# Patient Record
Sex: FEMALE | Race: WHITE | NOT HISPANIC OR LATINO | Employment: FULL TIME | URBAN - METROPOLITAN AREA
[De-identification: names, ages, dates, MRNs, and addresses within clinical notes are randomized per-mention and may not be internally consistent; named-entity substitution may affect disease eponyms.]

---

## 2021-08-13 ENCOUNTER — HOSPITAL ENCOUNTER (EMERGENCY)
Facility: HOSPITAL | Age: 36
Discharge: HOME | End: 2021-08-13
Attending: EMERGENCY MEDICINE
Payer: OTHER MISCELLANEOUS

## 2021-08-13 VITALS
OXYGEN SATURATION: 99 % | SYSTOLIC BLOOD PRESSURE: 168 MMHG | DIASTOLIC BLOOD PRESSURE: 100 MMHG | TEMPERATURE: 98 F | HEIGHT: 70 IN | RESPIRATION RATE: 18 BRPM | HEART RATE: 89 BPM | WEIGHT: 270 LBS | BODY MASS INDEX: 38.65 KG/M2

## 2021-08-13 DIAGNOSIS — S13.4XXA WHIPLASH INJURY TO NECK, INITIAL ENCOUNTER: ICD-10-CM
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
## 2021-08-13 DIAGNOSIS — M62.838 NECK MUSCLE SPASM: ICD-10-CM
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
## 2021-08-13 DIAGNOSIS — M54.9 BACK PAIN, UNSPECIFIED BACK LOCATION, UNSPECIFIED BACK PAIN LATERALITY, UNSPECIFIED CHRONICITY: ICD-10-CM

## 2021-08-13 LAB
AMPHET+METHAMPHET UR QL: NEGATIVE
B-HCG UR QL: NEGATIVE
BARBITURATES UR QL SCN>200 NG/ML: NEGATIVE
BENZODIAZ UR QL SCN>200 NG/ML: NEGATIVE
BZE UR QL SCN: NEGATIVE
CANNABINOIDS UR QL SCN: NEGATIVE
CREAT UR-MCNC: 276 MG/DL (ref 15–325)
CTP QC/QA: YES
OPIATES UR QL SCN: NEGATIVE
PCP UR QL SCN>25 NG/ML: NEGATIVE
TOXICOLOGY INFORMATION: NORMAL

## 2021-08-13 PROCEDURE — 63600175 PHARM REV CODE 636 W HCPCS: Performed by: NURSE PRACTITIONER

## 2021-08-13 PROCEDURE — 96372 THER/PROPH/DIAG INJ SC/IM: CPT | Mod: 59

## 2021-08-13 PROCEDURE — 80307 DRUG TEST PRSMV CHEM ANLYZR: CPT | Performed by: NURSE PRACTITIONER

## 2021-08-13 PROCEDURE — 96372 THER/PROPH/DIAG INJ SC/IM: CPT

## 2021-08-13 PROCEDURE — 99284 EMERGENCY DEPT VISIT MOD MDM: CPT

## 2021-08-13 PROCEDURE — 81025 URINE PREGNANCY TEST: CPT | Performed by: NURSE PRACTITIONER

## 2021-08-13 RX ORDER — KETOROLAC TROMETHAMINE 30 MG/ML
15 INJECTION, SOLUTION INTRAMUSCULAR; INTRAVENOUS
Status: DISCONTINUED | OUTPATIENT
Start: 2021-08-13 | End: 2021-08-13

## 2021-08-13 RX ORDER — LIDOCAINE 50 MG/G
1 PATCH TOPICAL DAILY
Qty: 15 PATCH | Refills: 0 | Status: SHIPPED | OUTPATIENT
Start: 2021-08-13

## 2021-08-13 RX ORDER — IBUPROFEN 600 MG/1
600 TABLET ORAL EVERY 6 HOURS PRN
Qty: 20 TABLET | Refills: 0 | Status: SHIPPED | OUTPATIENT
Start: 2021-08-13

## 2021-08-13 RX ORDER — ORPHENADRINE CITRATE 30 MG/ML
60 INJECTION INTRAMUSCULAR; INTRAVENOUS
Status: COMPLETED | OUTPATIENT
Start: 2021-08-13 | End: 2021-08-13

## 2021-08-13 RX ORDER — METHOCARBAMOL 500 MG/1
1000 TABLET, FILM COATED ORAL 3 TIMES DAILY
Qty: 30 TABLET | Refills: 0 | Status: SHIPPED | OUTPATIENT
Start: 2021-08-13 | End: 2021-08-18

## 2021-08-13 RX ORDER — KETOROLAC TROMETHAMINE 30 MG/ML
15 INJECTION, SOLUTION INTRAMUSCULAR; INTRAVENOUS
Status: COMPLETED | OUTPATIENT
Start: 2021-08-13 | End: 2021-08-13

## 2021-08-13 RX ADMIN — ORPHENADRINE CITRATE 60 MG: 60 INJECTION INTRAMUSCULAR; INTRAVENOUS at 06:08

## 2021-08-13 RX ADMIN — KETOROLAC TROMETHAMINE 15 MG: 30 INJECTION, SOLUTION INTRAMUSCULAR; INTRAVENOUS at 06:08

## 2021-10-20 ENCOUNTER — CLINICAL SUPPORT (OUTPATIENT)
Dept: URGENT CARE | Facility: CLINIC | Age: 36
End: 2021-10-20

## 2021-10-20 PROCEDURE — 86580 TB INTRADERMAL TEST: CPT | Mod: S$GLB,,, | Performed by: EMERGENCY MEDICINE

## 2021-10-20 PROCEDURE — 86580 PR  TB INTRADERMAL TEST: ICD-10-PCS | Mod: S$GLB,,, | Performed by: EMERGENCY MEDICINE

## 2022-03-27 ENCOUNTER — OFFICE VISIT (OUTPATIENT)
Dept: URGENT CARE | Facility: CLINIC | Age: 37
End: 2022-03-27

## 2022-03-27 VITALS
DIASTOLIC BLOOD PRESSURE: 91 MMHG | RESPIRATION RATE: 18 BRPM | OXYGEN SATURATION: 97 % | HEIGHT: 70 IN | TEMPERATURE: 99 F | HEART RATE: 110 BPM | SYSTOLIC BLOOD PRESSURE: 137 MMHG | WEIGHT: 278 LBS | BODY MASS INDEX: 39.8 KG/M2

## 2022-03-27 DIAGNOSIS — G44.329 CHRONIC POST-TRAUMATIC HEADACHE, NOT INTRACTABLE: ICD-10-CM

## 2022-03-27 DIAGNOSIS — M54.2 CHRONIC NECK PAIN: ICD-10-CM

## 2022-03-27 DIAGNOSIS — I95.1 ORTHOSTATIC HYPOTENSION: Primary | ICD-10-CM

## 2022-03-27 DIAGNOSIS — G89.29 CHRONIC NECK PAIN: ICD-10-CM

## 2022-03-27 DIAGNOSIS — R40.20 LOC (LOSS OF CONSCIOUSNESS): ICD-10-CM

## 2022-03-27 PROCEDURE — 99213 OFFICE O/P EST LOW 20 MIN: CPT | Mod: S$GLB,,, | Performed by: NURSE PRACTITIONER

## 2022-03-27 PROCEDURE — 99213 PR OFFICE/OUTPT VISIT, EST, LEVL III, 20-29 MIN: ICD-10-PCS | Mod: S$GLB,,, | Performed by: NURSE PRACTITIONER

## 2022-03-27 RX ORDER — METHOCARBAMOL 500 MG/1
500 TABLET, FILM COATED ORAL
COMMUNITY
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
## 2022-07-22 NOTE — PROGRESS NOTES
Chart sent to medical director for chart review per [facility] collaborative requirement.

## 2022-08-22 DIAGNOSIS — G47.19 EXCESSIVE DAYTIME SLEEPINESS: ICD-10-CM

## 2022-08-22 DIAGNOSIS — R06.83 SNORING: ICD-10-CM

## 2022-08-22 DIAGNOSIS — G47.33 OSA (OBSTRUCTIVE SLEEP APNEA): Primary | ICD-10-CM

## 2022-08-22 DIAGNOSIS — R53.83 LOSS OF ENERGY: ICD-10-CM

## 2022-09-08 ENCOUNTER — PROCEDURE VISIT (OUTPATIENT)
Dept: SLEEP MEDICINE | Facility: HOSPITAL | Age: 37
End: 2022-09-08
Attending: INTERNAL MEDICINE
Payer: COMMERCIAL

## 2022-09-08 DIAGNOSIS — R53.83 LOSS OF ENERGY: ICD-10-CM

## 2022-09-08 DIAGNOSIS — G47.33 OSA (OBSTRUCTIVE SLEEP APNEA): ICD-10-CM

## 2022-09-08 DIAGNOSIS — R06.83 SNORING: ICD-10-CM

## 2022-09-08 DIAGNOSIS — G47.19 EXCESSIVE DAYTIME SLEEPINESS: ICD-10-CM

## 2022-09-08 PROCEDURE — 95806 SLEEP STUDY UNATT&RESP EFFT: CPT

## 2023-03-10 ENCOUNTER — TELEPHONE (OUTPATIENT)
Dept: FAMILY MEDICINE | Facility: CLINIC | Age: 38
End: 2023-03-10

## 2023-03-13 ENCOUNTER — OFFICE VISIT (OUTPATIENT)
Dept: FAMILY MEDICINE | Facility: CLINIC | Age: 38
End: 2023-03-13
Payer: COMMERCIAL

## 2023-03-13 VITALS
OXYGEN SATURATION: 98 % | DIASTOLIC BLOOD PRESSURE: 76 MMHG | HEIGHT: 68 IN | SYSTOLIC BLOOD PRESSURE: 136 MMHG | HEART RATE: 88 BPM | BODY MASS INDEX: 42.46 KG/M2 | WEIGHT: 280.19 LBS

## 2023-03-13 DIAGNOSIS — Z00.00 ANNUAL PHYSICAL EXAM: Primary | ICD-10-CM

## 2023-03-13 DIAGNOSIS — E55.9 VITAMIN D DEFICIENCY: ICD-10-CM

## 2023-03-13 DIAGNOSIS — E66.01 MORBID OBESITY DUE TO EXCESS CALORIES: ICD-10-CM

## 2023-03-13 DIAGNOSIS — F32.0 MILD MAJOR DEPRESSION: ICD-10-CM

## 2023-03-13 DIAGNOSIS — R73.03 PREDIABETES: ICD-10-CM

## 2023-03-13 PROCEDURE — 3008F PR BODY MASS INDEX (BMI) DOCUMENTED: ICD-10-PCS | Mod: CPTII,S$GLB,, | Performed by: NURSE PRACTITIONER

## 2023-03-13 PROCEDURE — 3078F DIAST BP <80 MM HG: CPT | Mod: CPTII,S$GLB,, | Performed by: NURSE PRACTITIONER

## 2023-03-13 PROCEDURE — 3075F SYST BP GE 130 - 139MM HG: CPT | Mod: CPTII,S$GLB,, | Performed by: NURSE PRACTITIONER

## 2023-03-13 PROCEDURE — 1160F RVW MEDS BY RX/DR IN RCRD: CPT | Mod: CPTII,S$GLB,, | Performed by: NURSE PRACTITIONER

## 2023-03-13 PROCEDURE — 1159F MED LIST DOCD IN RCRD: CPT | Mod: CPTII,S$GLB,, | Performed by: NURSE PRACTITIONER

## 2023-03-13 PROCEDURE — 99204 OFFICE O/P NEW MOD 45 MIN: CPT | Mod: S$GLB,,, | Performed by: NURSE PRACTITIONER

## 2023-03-13 PROCEDURE — 1159F PR MEDICATION LIST DOCUMENTED IN MEDICAL RECORD: ICD-10-PCS | Mod: CPTII,S$GLB,, | Performed by: NURSE PRACTITIONER

## 2023-03-13 PROCEDURE — 3008F BODY MASS INDEX DOCD: CPT | Mod: CPTII,S$GLB,, | Performed by: NURSE PRACTITIONER

## 2023-03-13 PROCEDURE — 3075F PR MOST RECENT SYSTOLIC BLOOD PRESS GE 130-139MM HG: ICD-10-PCS | Mod: CPTII,S$GLB,, | Performed by: NURSE PRACTITIONER

## 2023-03-13 PROCEDURE — 3078F PR MOST RECENT DIASTOLIC BLOOD PRESSURE < 80 MM HG: ICD-10-PCS | Mod: CPTII,S$GLB,, | Performed by: NURSE PRACTITIONER

## 2023-03-13 PROCEDURE — 1160F PR REVIEW ALL MEDS BY PRESCRIBER/CLIN PHARMACIST DOCUMENTED: ICD-10-PCS | Mod: CPTII,S$GLB,, | Performed by: NURSE PRACTITIONER

## 2023-03-13 PROCEDURE — 99204 PR OFFICE/OUTPT VISIT, NEW, LEVL IV, 45-59 MIN: ICD-10-PCS | Mod: S$GLB,,, | Performed by: NURSE PRACTITIONER

## 2023-03-13 RX ORDER — ESZOPICLONE 3 MG/1
3 TABLET, FILM COATED ORAL
COMMUNITY
Start: 2022-05-17 | End: 2023-04-10 | Stop reason: SDUPTHER

## 2023-03-13 RX ORDER — SEMAGLUTIDE 0.25 MG/.5ML
0.25 INJECTION, SOLUTION SUBCUTANEOUS
Qty: 2 ML | Refills: 2 | Status: SHIPPED | OUTPATIENT
Start: 2023-03-13 | End: 2023-04-17

## 2023-03-13 RX ORDER — PHENTERMINE HYDROCHLORIDE 37.5 MG/1
37.5 CAPSULE ORAL EVERY MORNING
COMMUNITY
End: 2023-03-13 | Stop reason: SDUPTHER

## 2023-03-13 RX ORDER — PHENTERMINE HYDROCHLORIDE 37.5 MG/1
37.5 CAPSULE ORAL EVERY MORNING
Qty: 30 CAPSULE | Refills: 0 | Status: SHIPPED | OUTPATIENT
Start: 2023-03-13 | End: 2023-04-17 | Stop reason: SDUPTHER

## 2023-03-13 RX ORDER — DULOXETIN HYDROCHLORIDE 30 MG/1
1 CAPSULE, DELAYED RELEASE ORAL EVERY MORNING
COMMUNITY
Start: 2022-11-21 | End: 2023-11-27 | Stop reason: SDUPTHER
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
## 2023-04-10 RX ORDER — ESZOPICLONE 3 MG/1
3 TABLET, FILM COATED ORAL DAILY
Qty: 30 TABLET | Refills: 5 | Status: SHIPPED | OUTPATIENT
Start: 2023-04-10 | End: 2023-10-06 | Stop reason: SDUPTHER

## 2023-04-10 NOTE — TELEPHONE ENCOUNTER
----- Message from Nori Finnegan sent at 4/10/2023 10:22 AM CDT -----  Refill [facility] [address] [place]

## 2023-04-12 LAB
25(OH)D3 SERPL-MCNC: 18 NG/ML (ref 30–100)
ALBUMIN SERPL-MCNC: 4.2 G/DL (ref 3.6–5.1)
ALBUMIN/CREAT UR: 84 MCG/MG CREAT
ALBUMIN/GLOB SERPL: 1.4 (CALC) (ref 1–2.5)
ALP SERPL-CCNC: 52 U/L (ref 31–125)
ALT SERPL-CCNC: 13 U/L (ref 6–29)
APPEARANCE UR: ABNORMAL
AST SERPL-CCNC: 15 U/L (ref 10–30)
BACTERIA #/AREA URNS HPF: ABNORMAL /HPF
BACTERIA UR CULT: ABNORMAL
BACTERIA UR CULT: ABNORMAL
BASOPHILS # BLD AUTO: 72 CELLS/UL (ref 0–200)
BASOPHILS NFR BLD AUTO: 1.2 %
BILIRUB SERPL-MCNC: 0.3 MG/DL (ref 0.2–1.2)
BILIRUB UR QL STRIP: NEGATIVE
BUN SERPL-MCNC: 8 MG/DL (ref 7–25)
BUN/CREAT SERPL: ABNORMAL (CALC) (ref 6–22)
CALCIUM SERPL-MCNC: 9.2 MG/DL (ref 8.6–10.2)
CAOX CRY #/AREA URNS HPF: ABNORMAL /HPF
CHLORIDE SERPL-SCNC: 104 MMOL/L (ref 98–110)
CHOLEST SERPL-MCNC: 154 MG/DL
CHOLEST/HDLC SERPL: 3.5 (CALC)
CO2 SERPL-SCNC: 28 MMOL/L (ref 20–32)
COLOR UR: ABNORMAL
CREAT SERPL-MCNC: 0.69 MG/DL (ref 0.5–0.97)
CREAT UR-MCNC: 226 MG/DL (ref 20–275)
EGFR: 114 ML/MIN/1.73M2
EOSINOPHIL # BLD AUTO: 162 CELLS/UL (ref 15–500)
EOSINOPHIL NFR BLD AUTO: 2.7 %
ERYTHROCYTE [DISTWIDTH] IN BLOOD BY AUTOMATED COUNT: 14.9 % (ref 11–15)
GLOBULIN SER CALC-MCNC: 2.9 G/DL (CALC) (ref 1.9–3.7)
GLUCOSE SERPL-MCNC: 101 MG/DL (ref 65–99)
GLUCOSE UR QL STRIP: NEGATIVE
HBA1C MFR BLD: 5.3 % OF TOTAL HGB
HCT VFR BLD AUTO: 37.5 % (ref 35–45)
HDLC SERPL-MCNC: 44 MG/DL
HGB BLD-MCNC: 11.8 G/DL (ref 11.7–15.5)
HGB UR QL STRIP: ABNORMAL
HYALINE CASTS #/AREA URNS LPF: ABNORMAL /LPF
KETONES UR QL STRIP: NEGATIVE
LDLC SERPL CALC-MCNC: 80 MG/DL (CALC)
LEUKOCYTE ESTERASE UR QL STRIP: ABNORMAL
LYMPHOCYTES # BLD AUTO: 1848 CELLS/UL (ref 850–3900)
LYMPHOCYTES NFR BLD AUTO: 30.8 %
MCH RBC QN AUTO: 27 PG (ref 27–33)
MCHC RBC AUTO-ENTMCNC: 31.5 G/DL (ref 32–36)
MCV RBC AUTO: 85.8 FL (ref 80–100)
MICROALBUMIN UR-MCNC: 18.9 MG/DL
MONOCYTES # BLD AUTO: 582 CELLS/UL (ref 200–950)
MONOCYTES NFR BLD AUTO: 9.7 %
NEUTROPHILS # BLD AUTO: 3336 CELLS/UL (ref 1500–7800)
NEUTROPHILS NFR BLD AUTO: 55.6 %
NITRITE UR QL STRIP: NEGATIVE
NONHDLC SERPL-MCNC: 110 MG/DL (CALC)
PH UR STRIP: 6 [PH] (ref 5–8)
PLATELET # BLD AUTO: 423 THOUSAND/UL (ref 140–400)
PMV BLD REES-ECKER: 9.7 FL (ref 7.5–12.5)
POTASSIUM SERPL-SCNC: 4.3 MMOL/L (ref 3.5–5.3)
PROT SERPL-MCNC: 7.1 G/DL (ref 6.1–8.1)
PROT UR QL STRIP: ABNORMAL
RBC # BLD AUTO: 4.37 MILLION/UL (ref 3.8–5.1)
RBC #/AREA URNS HPF: ABNORMAL /HPF
SERVICE CMNT-IMP: ABNORMAL
SODIUM SERPL-SCNC: 138 MMOL/L (ref 135–146)
SP GR UR STRIP: 1.03 (ref 1–1.03)
SQUAMOUS #/AREA URNS HPF: ABNORMAL /HPF
TRIGL SERPL-MCNC: 205 MG/DL
TSH SERPL-ACNC: 1.98 MIU/L
WBC # BLD AUTO: 6 THOUSAND/UL (ref 3.8–10.8)
WBC #/AREA URNS HPF: ABNORMAL /HPF

## 2023-04-17 ENCOUNTER — OFFICE VISIT (OUTPATIENT)
Dept: FAMILY MEDICINE | Facility: CLINIC | Age: 38
End: 2023-04-17
Payer: COMMERCIAL

## 2023-04-17 VITALS
HEART RATE: 100 BPM | WEIGHT: 276.19 LBS | HEIGHT: 68 IN | OXYGEN SATURATION: 95 % | SYSTOLIC BLOOD PRESSURE: 132 MMHG | BODY MASS INDEX: 41.86 KG/M2 | DIASTOLIC BLOOD PRESSURE: 84 MMHG

## 2023-04-17 DIAGNOSIS — E66.01 MORBID OBESITY DUE TO EXCESS CALORIES: Primary | ICD-10-CM

## 2023-04-17 DIAGNOSIS — E55.9 VITAMIN D DEFICIENCY: ICD-10-CM

## 2023-04-17 PROCEDURE — 99213 PR OFFICE/OUTPT VISIT, EST, LEVL III, 20-29 MIN: ICD-10-PCS | Mod: S$GLB,,, | Performed by: NURSE PRACTITIONER

## 2023-04-17 PROCEDURE — 3008F BODY MASS INDEX DOCD: CPT | Mod: CPTII,S$GLB,, | Performed by: NURSE PRACTITIONER

## 2023-04-17 PROCEDURE — 1159F MED LIST DOCD IN RCRD: CPT | Mod: CPTII,S$GLB,, | Performed by: NURSE PRACTITIONER

## 2023-04-17 PROCEDURE — 1160F RVW MEDS BY RX/DR IN RCRD: CPT | Mod: CPTII,S$GLB,, | Performed by: NURSE PRACTITIONER

## 2023-04-17 PROCEDURE — 99213 OFFICE O/P EST LOW 20 MIN: CPT | Mod: S$GLB,,, | Performed by: NURSE PRACTITIONER

## 2023-04-17 PROCEDURE — 3075F PR MOST RECENT SYSTOLIC BLOOD PRESS GE 130-139MM HG: ICD-10-PCS | Mod: CPTII,S$GLB,, | Performed by: NURSE PRACTITIONER

## 2023-04-17 PROCEDURE — 3044F PR MOST RECENT HEMOGLOBIN A1C LEVEL <7.0%: ICD-10-PCS | Mod: CPTII,S$GLB,, | Performed by: NURSE PRACTITIONER

## 2023-04-17 PROCEDURE — 3079F DIAST BP 80-89 MM HG: CPT | Mod: CPTII,S$GLB,, | Performed by: NURSE PRACTITIONER

## 2023-04-17 PROCEDURE — 3079F PR MOST RECENT DIASTOLIC BLOOD PRESSURE 80-89 MM HG: ICD-10-PCS | Mod: CPTII,S$GLB,, | Performed by: NURSE PRACTITIONER

## 2023-04-17 PROCEDURE — 3008F PR BODY MASS INDEX (BMI) DOCUMENTED: ICD-10-PCS | Mod: CPTII,S$GLB,, | Performed by: NURSE PRACTITIONER

## 2023-04-17 PROCEDURE — 3075F SYST BP GE 130 - 139MM HG: CPT | Mod: CPTII,S$GLB,, | Performed by: NURSE PRACTITIONER

## 2023-04-17 PROCEDURE — 1160F PR REVIEW ALL MEDS BY PRESCRIBER/CLIN PHARMACIST DOCUMENTED: ICD-10-PCS | Mod: CPTII,S$GLB,, | Performed by: NURSE PRACTITIONER

## 2023-04-17 PROCEDURE — 1159F PR MEDICATION LIST DOCUMENTED IN MEDICAL RECORD: ICD-10-PCS | Mod: CPTII,S$GLB,, | Performed by: NURSE PRACTITIONER

## 2023-04-17 PROCEDURE — 3044F HG A1C LEVEL LT 7.0%: CPT | Mod: CPTII,S$GLB,, | Performed by: NURSE PRACTITIONER

## 2023-04-17 RX ORDER — PHENTERMINE HYDROCHLORIDE 37.5 MG/1
37.5 CAPSULE ORAL EVERY MORNING
Qty: 30 CAPSULE | Refills: 0 | Status: SHIPPED | OUTPATIENT
Start: 2023-04-17 | End: 2023-05-16 | Stop reason: SDUPTHER

## 2023-04-17 RX ORDER — ERGOCALCIFEROL 1.25 MG/1
50000 CAPSULE ORAL
Qty: 8 CAPSULE | Refills: 0 | Status: SHIPPED | OUTPATIENT
Start: 2023-04-17 | End: 2023-11-27

## 2023-04-17 NOTE — TELEPHONE ENCOUNTER
Pt is needing a refill on her phentermine. Pt was seen to day in the office. Next office visit 05/16/2023

## 2023-04-17 NOTE — PROGRESS NOTES
SUBJECTIVE:    Patient ID: Janneth Nascimento is a 37 y.o. female.    Chief Complaint: Follow-up (No bottles//Pt is here 4 week check up//Mansi )    Patient here for one-month follow-up-weight check/phentermine. Pt reports overall doing well. No side effects with phentermine. Has noticed reduction in appetite on medication- Weight down here 4 lbs since last month though feels she's down a few more on home scale      Office Visit on 03/13/2023   Component Date Value Ref Range Status    WBC 04/10/2023 6.0  3.8 - 10.8 Thousand/uL Final    RBC 04/10/2023 4.37  3.80 - 5.10 Million/uL Final    Hemoglobin 04/10/2023 11.8  11.7 - 15.5 g/dL Final    Hematocrit 04/10/2023 37.5  35.0 - 45.0 % Final    MCV 04/10/2023 85.8  80.0 - 100.0 fL Final    MCH 04/10/2023 27.0  27.0 - 33.0 pg Final    MCHC 04/10/2023 31.5 (L)  32.0 - 36.0 g/dL Final    RDW 04/10/2023 14.9  11.0 - 15.0 % Final    Platelets 04/10/2023 423 (H)  140 - 400 Thousand/uL Final    MPV 04/10/2023 9.7  7.5 - 12.5 fL Final    Neutrophils, Abs 04/10/2023 3,336  1,500 - 7,800 cells/uL Final    Lymph # 04/10/2023 1,848  850 - 3,900 cells/uL Final    Mono # 04/10/2023 582  200 - 950 cells/uL Final    Eos # 04/10/2023 162  15 - 500 cells/uL Final    Baso # 04/10/2023 72  0 - 200 cells/uL Final    Neutrophils Relative 04/10/2023 55.6  % Final    Lymph % 04/10/2023 30.8  % Final    Mono % 04/10/2023 9.7  % Final    Eosinophil % 04/10/2023 2.7  % Final    Basophil % 04/10/2023 1.2  % Final    Glucose 04/10/2023 101 (H)  65 - 99 mg/dL Final    BUN 04/10/2023 8  7 - 25 mg/dL Final    Creatinine 04/10/2023 0.69  0.50 - 0.97 mg/dL Final    eGFR 04/10/2023 114  > OR = 60 mL/min/1.73m2 Final    BUN/Creatinine Ratio 04/10/2023 NOT APPLICABLE  6 - 22 (calc) Final    Sodium 04/10/2023 138  135 - 146 mmol/L Final    Potassium 04/10/2023 4.3  3.5 - 5.3 mmol/L Final    Chloride 04/10/2023 104  98 - 110 mmol/L Final    CO2 04/10/2023 28  20 - 32 mmol/L Final    Calcium 04/10/2023 9.2  8.6  - 10.2 mg/dL Final    Total Protein 04/10/2023 7.1  6.1 - 8.1 g/dL Final    Albumin 04/10/2023 4.2  3.6 - 5.1 g/dL Final    Globulin, Total 04/10/2023 2.9  1.9 - 3.7 g/dL (calc) Final    Albumin/Globulin Ratio 04/10/2023 1.4  1.0 - 2.5 (calc) Final    Total Bilirubin 04/10/2023 0.3  0.2 - 1.2 mg/dL Final    Alkaline Phosphatase 04/10/2023 52  31 - 125 U/L Final    AST 04/10/2023 15  10 - 30 U/L Final    ALT 04/10/2023 13  6 - 29 U/L Final    Hemoglobin A1C 04/10/2023 5.3  <5.7 % of total Hgb Final    Cholesterol 04/10/2023 154  <200 mg/dL Final    HDL 04/10/2023 44 (L)  > OR = 50 mg/dL Final    Triglycerides 04/10/2023 205 (H)  <150 mg/dL Final    LDL Cholesterol 04/10/2023 80  mg/dL (calc) Final    HDL/Cholesterol Ratio 04/10/2023 3.5  <5.0 (calc) Final    Non HDL Chol. (LDL+VLDL) 04/10/2023 110  <130 mg/dL (calc) Final    TSH w/reflex to FT4 04/10/2023 1.98  mIU/L Final    Color, UA 04/10/2023 DARK YELLOW  YELLOW Final    Appearance, UA 04/10/2023 CLOUDY (A)  CLEAR Final    Specific Gravity, UA 04/10/2023 1.026  1.001 - 1.035 Final    pH, UA 04/10/2023 6.0  5.0 - 8.0 Final    Glucose, UA 04/10/2023 NEGATIVE  NEGATIVE Final    Bilirubin, UA 04/10/2023 NEGATIVE  NEGATIVE Final    Ketones, UA 04/10/2023 NEGATIVE  NEGATIVE Final    Occult Blood UA 04/10/2023 3+ (A)  NEGATIVE Final    Protein, UA 04/10/2023 2+ (A)  NEGATIVE Final    Nitrite, UA 04/10/2023 NEGATIVE  NEGATIVE Final    Leukocytes, UA 04/10/2023 1+ (A)  NEGATIVE Final    WBC Casts, UA 04/10/2023 0-5  < OR = 5 /HPF Final    RBC Casts, UA 04/10/2023 20-40 (A)  < OR = 2 /HPF Final    Squam Epithel, UA 04/10/2023 0-5  < OR = 5 /HPF Final    Bacteria, UA 04/10/2023 NONE SEEN  NONE SEEN /HPF Final    Ca Oxalate Mir[place]e, UA 04/10/2023 MANY (A)  NONE OR FEW /HPF Final    Hyaline Casts, UA 04/10/2023 NONE SEEN  NONE SEEN /LPF Final    Service Cmt: 04/10/2023    Final    Reflexive Urine Culture 04/10/2023    Final    Urine Culture, Routine 04/10/2023    Final     Creatinine, Urine 04/10/2023 226  20 - 275 mg/dL Final    Microalb, Ur 04/10/2023 18.9  See Note: mg/dL Final    Microalb/Creat Ratio 04/10/2023 84 (H)  <30 mcg/mg creat Final    Vitamin D, 25-OH, Total 04/10/2023 18 (L)  30 - 100 ng/mL Final       Past Medical History:   Diagnosis Date    Allergy     Insomnia      Past Surgical History:   Procedure Laterality Date    ceserean section  2007     Family History   Problem Relation Age of Onset    Hypertension Father     Arthritis Paternal Grandmother     COPD Paternal Grandmother     Diabetes Paternal Grandmother        The 10-year CVD risk score (Samantha, et al., 2008) is: 2.2%    Values used to calculate the score:      Age: 37 years      Sex: Female      Diabetic: No      Tobacco smoker: No      Systolic Blood Pressure: 132 mmHg      Is BP treated: No      HDL Cholesterol: 44 mg/dL      Total Cholesterol: 154 mg/dL     Marital Status: Single  Alcohol History:  reports that she does not currently use alcohol.  Tobacco History:  reports that she has never smoked. She has never used smokeless tobacco.  Drug History:  reports that she does not currently use drugs.    Health Maintenance Topics with due status: Not Due       Topic Last Completion Date    Hemoglobin A1c (Prediabetes) 04/10/2023       There is no immunization history on file for this patient.    Review of patient's allergies indicates:  No Known Allergies    Current Outpatient Medications:     DULoxetine (CYMBALTA) 30 MG capsule, Take 1 capsule by mouth every morning., Disp: , Rfl:     ergocalciferol (ERGOCALCIFEROL) 50,000 unit Cap, Take 1 capsule (50,000 Units total) by mouth every 7 days., Disp: 8 capsule, Rfl: 0    eszopiclone (LUNESTA) 3 mg Tab, Take 1 tablet (3 mg total) by mouth once daily., Disp: 30 tablet, Rfl: 5    fexofenadine (ALLEGRA) 60 MG tablet, Take 1 tablet by mouth Twice daily. Twice a day, Disp: , Rfl:     ibuprofen (ADVIL,MOTRIN) 600 MG tablet, Take 1 tablet (600 mg total) by mouth  "every 6 (six) hours as needed for Pain. (Patient not taking: Reported on 3/13/2023), Disp: 20 tablet, Rfl: 0    LIDOcaine (LIDODERM) 5 %, Place 1 patch onto the skin once daily. Remove & Discard patch within 12 hours or as directed by MD, Disp: 15 patch, Rfl: 0    methocarbamoL (ROBAXIN) 500 MG Tab, Take 500 mg by mouth as needed. Pt unsure of dosage, Disp: , Rfl:     phentermine 37.5 MG capsule, Take 1 capsule (37.5 mg total) by mouth every morning., Disp: 30 capsule, Rfl: 0    Review of Systems   Constitutional:  Positive for appetite change. Negative for chills, fever and unexpected weight change.   Respiratory:  Negative for cough, shortness of breath and wheezing.    Cardiovascular:  Negative for chest pain, palpitations and leg swelling.   Gastrointestinal:  Negative for abdominal pain.   Genitourinary:  Negative for dysuria and frequency.   Neurological:  Negative for dizziness and headaches.        Objective:      Vitals:    04/17/23 1501 04/17/23 1512   BP: 132/84    Pulse: 107 100   SpO2: 95%    Weight: 125.3 kg (276 lb 3.2 oz)    Height: 5' 8.2" (1.732 m)      Physical Exam  Vitals reviewed.   Constitutional:       General: She is not in acute distress.     Appearance: She is well-developed. She is obese.   HENT:      Head: Normocephalic and atraumatic.   Cardiovascular:      Rate and Rhythm: Normal rate and regular rhythm.      Heart sounds: No murmur heard.  Pulmonary:      Effort: Pulmonary effort is normal. No respiratory distress.      Breath sounds: Normal breath sounds.   Abdominal:      Palpations: Abdomen is soft.   Musculoskeletal:      Right lower leg: No edema.      Left lower leg: No edema.   Skin:     General: Skin is warm and dry.      Findings: No rash.   Neurological:      General: No focal deficit present.      Mental Status: She is alert and oriented to person, place, and time.      Gait: Gait normal.         Assessment:       1. Morbid obesity due to excess calories    2. Vitamin D " deficiency           Plan:       1. Morbid obesity due to excess calories   -phentermine to be refilled. Discussed imp of healthy diet choices and portion control    2. Vitamin D deficiency  -reviewed recent labs with pt, Vitamin D low- will supplement with prescription for 8 weeks then begin OTC vitamin D  -     ergocalciferol (ERGOCALCIFEROL) 50,000 unit Cap; Take 1 capsule (50,000 Units total) by mouth every 7 days.  Dispense: 8 capsule; Refill: 0      Follow up in about 1 month (around 5/17/2023) for weight/BP check.             4/17/2023 Mariam Soriano NP

## 2023-04-17 NOTE — PATIENT INSTRUCTIONS
Start prescription Vitamin D [id number] one capsule weekly for 8 weeks then begin over the counter vitamin D3 2000units daily

## 2023-05-16 ENCOUNTER — TELEPHONE (OUTPATIENT)
Dept: FAMILY MEDICINE | Facility: CLINIC | Age: 38
End: 2023-05-16

## 2023-05-16 ENCOUNTER — OFFICE VISIT (OUTPATIENT)
Dept: FAMILY MEDICINE | Facility: CLINIC | Age: 38
End: 2023-05-16
Payer: COMMERCIAL

## 2023-05-16 VITALS
HEIGHT: 68 IN | HEART RATE: 72 BPM | OXYGEN SATURATION: 96 % | SYSTOLIC BLOOD PRESSURE: 128 MMHG | WEIGHT: 269 LBS | BODY MASS INDEX: 40.77 KG/M2 | DIASTOLIC BLOOD PRESSURE: 88 MMHG

## 2023-05-16 DIAGNOSIS — E66.01 MORBID OBESITY DUE TO EXCESS CALORIES: Primary | ICD-10-CM

## 2023-05-16 DIAGNOSIS — G56.01 CARPAL TUNNEL SYNDROME OF RIGHT WRIST: ICD-10-CM

## 2023-05-16 PROCEDURE — 1160F RVW MEDS BY RX/DR IN RCRD: CPT | Mod: CPTII,S$GLB,, | Performed by: NURSE PRACTITIONER

## 2023-05-16 PROCEDURE — 3044F HG A1C LEVEL LT 7.0%: CPT | Mod: CPTII,S$GLB,, | Performed by: NURSE PRACTITIONER

## 2023-05-16 PROCEDURE — 3074F SYST BP LT 130 MM HG: CPT | Mod: CPTII,S$GLB,, | Performed by: NURSE PRACTITIONER

## 2023-05-16 PROCEDURE — 3008F PR BODY MASS INDEX (BMI) DOCUMENTED: ICD-10-PCS | Mod: CPTII,S$GLB,, | Performed by: NURSE PRACTITIONER

## 2023-05-16 PROCEDURE — 3008F BODY MASS INDEX DOCD: CPT | Mod: CPTII,S$GLB,, | Performed by: NURSE PRACTITIONER

## 2023-05-16 PROCEDURE — 1160F PR REVIEW ALL MEDS BY PRESCRIBER/CLIN PHARMACIST DOCUMENTED: ICD-10-PCS | Mod: CPTII,S$GLB,, | Performed by: NURSE PRACTITIONER

## 2023-05-16 PROCEDURE — 3044F PR MOST RECENT HEMOGLOBIN A1C LEVEL <7.0%: ICD-10-PCS | Mod: CPTII,S$GLB,, | Performed by: NURSE PRACTITIONER

## 2023-05-16 PROCEDURE — 1159F MED LIST DOCD IN RCRD: CPT | Mod: CPTII,S$GLB,, | Performed by: NURSE PRACTITIONER

## 2023-05-16 PROCEDURE — 99213 OFFICE O/P EST LOW 20 MIN: CPT | Mod: S$GLB,,, | Performed by: NURSE PRACTITIONER

## 2023-05-16 PROCEDURE — 99213 PR OFFICE/OUTPT VISIT, EST, LEVL III, 20-29 MIN: ICD-10-PCS | Mod: S$GLB,,, | Performed by: NURSE PRACTITIONER

## 2023-05-16 PROCEDURE — 3079F DIAST BP 80-89 MM HG: CPT | Mod: CPTII,S$GLB,, | Performed by: NURSE PRACTITIONER

## 2023-05-16 PROCEDURE — 1159F PR MEDICATION LIST DOCUMENTED IN MEDICAL RECORD: ICD-10-PCS | Mod: CPTII,S$GLB,, | Performed by: NURSE PRACTITIONER

## 2023-05-16 PROCEDURE — 3079F PR MOST RECENT DIASTOLIC BLOOD PRESSURE 80-89 MM HG: ICD-10-PCS | Mod: CPTII,S$GLB,, | Performed by: NURSE PRACTITIONER

## 2023-05-16 PROCEDURE — 3074F PR MOST RECENT SYSTOLIC BLOOD PRESSURE < 130 MM HG: ICD-10-PCS | Mod: CPTII,S$GLB,, | Performed by: NURSE PRACTITIONER

## 2023-05-16 RX ORDER — PHENTERMINE HYDROCHLORIDE 37.5 MG/1
37.5 CAPSULE ORAL EVERY MORNING
Qty: 30 CAPSULE | Refills: 0 | Status: CANCELLED | OUTPATIENT
Start: 2023-05-16

## 2023-05-16 RX ORDER — PHENTERMINE HYDROCHLORIDE 37.5 MG/1
37.5 CAPSULE ORAL EVERY MORNING
Qty: 30 CAPSULE | Refills: 0 | Status: SHIPPED | OUTPATIENT
Start: 2023-05-16 | End: 2023-11-27

## 2023-05-16 NOTE — TELEPHONE ENCOUNTER
Pt needs a refill on her phentermine. Last office visit 04/17/2023. Pt is here today for an office visit

## 2023-05-16 NOTE — PROGRESS NOTES
SUBJECTIVE:    Patient ID: Janneth Nascimento is a 37 y.o. female.    Chief Complaint: Weight Check (No bottles//Pt is here for a weight check up and refills on phentermine//JOHN )    Patient here for one-month follow-up-weight check/phentermine month #2    Pt reports overall doing well. Wt down 7lbs since last months visit    Pt c/oing of some mild numbness/tingling she's recently started with to right hand at night. Denies pain or loss of strength,. 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        Office Visit on 03/13/2023   Component Date Value Ref Range Status    WBC 04/10/2023 6.0  3.8 - 10.8 Thousand/uL Final    RBC 04/10/2023 4.37  3.80 - 5.10 Million/uL Final    Hemoglobin 04/10/2023 11.8  11.7 - 15.5 g/dL Final    Hematocrit 04/10/2023 37.5  35.0 - 45.0 % Final    MCV 04/10/2023 85.8  80.0 - 100.0 fL Final    MCH 04/10/2023 27.0  27.0 - 33.0 pg Final    MCHC 04/10/2023 31.5 (L)  32.0 - 36.0 g/dL Final    RDW 04/10/2023 14.9  11.0 - 15.0 % Final    Platelets 04/10/2023 423 (H)  140 - 400 Thousand/uL Final    MPV 04/10/2023 9.7  7.5 - 12.5 fL Final    Neutrophils, Abs 04/10/2023 3,336  1,500 - 7,800 cells/uL Final    Lymph # 04/10/2023 1,848  850 - 3,900 cells/uL Final    Mono # 04/10/2023 582  200 - 950 cells/uL Final    Eos # 04/10/2023 162  15 - 500 cells/uL Final    Baso # 04/10/2023 72  0 - 200 cells/uL Final    Neutrophils Relative 04/10/2023 55.6  % Final    Lymph % 04/10/2023 30.8  % Final    Mono % 04/10/2023 9.7  % Final    Eosinophil % 04/10/2023 2.7  % Final    Basophil % 04/10/2023 1.2  % Final    Glucose 04/10/2023 101 (H)  65 - 99 mg/dL Final    BUN 04/10/2023 8  7 - 25 mg/dL Final    Creatinine 04/10/2023 0.69  0.50 - 0.97 mg/dL Final    eGFR 04/10/2023 114  > OR = 60 mL/min/1.73m2 Final    BUN/Creatinine Ratio 04/10/2023 NOT APPLICABLE  6 - 22 (calc) Final    Sodium 04/10/2023 138  135 - 146 mmol/L Final    Potassium 04/10/2023 4.3   3.5 - 5.3 mmol/L Final    Chloride 04/10/2023 104  98 - 110 mmol/L Final    CO2 04/10/2023 28  20 - 32 mmol/L Final    Calcium 04/10/2023 9.2  8.6 - 10.2 mg/dL Final    Total Protein 04/10/2023 7.1  6.1 - 8.1 g/dL Final    Albumin 04/10/2023 4.2  3.6 - 5.1 g/dL Final    Globulin, Total 04/10/2023 2.9  1.9 - 3.7 g/dL (calc) Final    Albumin/Globulin Ratio 04/10/2023 1.4  1.0 - 2.5 (calc) Final    Total Bilirubin 04/10/2023 0.3  0.2 - 1.2 mg/dL Final    Alkaline Phosphatase 04/10/2023 52  31 - 125 U/L Final    AST 04/10/2023 15  10 - 30 U/L Final    ALT 04/10/2023 13  6 - 29 U/L Final    Hemoglobin A1C 04/10/2023 5.3  <5.7 % of total Hgb Final    Cholesterol 04/10/2023 154  <200 mg/dL Final    HDL 04/10/2023 44 (L)  > OR = 50 mg/dL Final    Triglycerides 04/10/2023 205 (H)  <150 mg/dL Final    LDL Cholesterol 04/10/2023 80  mg/dL (calc) Final    HDL/Cholesterol Ratio 04/10/2023 3.5  <5.0 (calc) Final    Non HDL Chol. (LDL+VLDL) 04/10/2023 110  <130 mg/dL (calc) Final    TSH w/reflex to FT4 04/10/2023 1.98  mIU/L Final    Color, UA 04/10/2023 DARK YELLOW  YELLOW Final    Appearance, UA 04/10/2023 CLOUDY (A)  CLEAR Final    Specific Gravity, UA 04/10/2023 1.026  1.001 - 1.035 Final    pH, UA 04/10/2023 6.0  5.0 - 8.0 Final    Glucose, UA 04/10/2023 NEGATIVE  NEGATIVE Final    Bilirubin, UA 04/10/2023 NEGATIVE  NEGATIVE Final    Ketones, UA 04/10/2023 NEGATIVE  NEGATIVE Final    Occult Blood UA 04/10/2023 3+ (A)  NEGATIVE Final    Protein, UA 04/10/2023 2+ (A)  NEGATIVE Final    Nitrite, UA 04/10/2023 NEGATIVE  NEGATIVE Final    Leukocytes, UA 04/10/2023 1+ (A)  NEGATIVE Final    WBC Casts, UA 04/10/2023 0-5  < OR = 5 /HPF Final    RBC Casts, UA 04/10/2023 20-40 (A)  < OR = 2 /HPF Final    Squam Epithel, UA 04/10/2023 0-5  < OR = 5 /HPF Final    Bacteria, UA 04/10/2023 NONE SEEN  NONE SEEN /HPF Final    Ca Oxalate Mirlande, UA 04/10/2023 MANY (A)  NONE OR FEW /HPF Final    Hyaline Casts, UA 04/10/2023 NONE SEEN  NONE SEEN  /LPF Final    Service Cmt: 04/10/2023    Final    Reflexive Urine Culture 04/10/2023    Final    Urine Culture, Routine 04/10/2023    Final    Creatinine, Urine 04/10/2023 226  20 - 275 mg/dL Final    Microalb, Ur 04/10/2023 18.9  See Note: mg/dL Final    Microalb/Creat Ratio 04/10/2023 84 (H)  <30 mcg/mg creat Final    Vitamin D, 25-OH, Total 04/10/2023 18 (L)  30 - 100 ng/mL Final       Past Medical History:   Diagnosis Date    Allergy     Insomnia      Past Surgical History:   Procedure Laterality Date    ceserean section  2007     Family History   Problem Relation Age of Onset    Hypertension Father     Arthritis Paternal Grandmother     COPD Paternal Grandmother     Diabetes Paternal Grandmother        The 10-year CVD risk score (Samantha et al., 2008) is: 2%    Values used to calculate the score:      Age: 37 years      Sex: Female      Diabetic: No      Tobacco smoker: No      Systolic Blood Pressure: 128 mmHg      Is BP treated: No      HDL Cholesterol: 44 mg/dL      Total Cholesterol: 154 mg/dL     Marital Status: Single  Alcohol History:  reports that she does not currently use alcohol.  Tobacco History:  reports that she has never smoked. She has never been exposed to tobacco smoke. She has never used smokeless tobacco.  Drug History:  reports that she does not currently use drugs.    Health Maintenance Topics with due status: Not Due       Topic Last Completion Date    Hemoglobin A1c (Prediabetes) 04/10/2023       There is no immunization history on file for this patient.    Review of patient's allergies indicates:  No Known Allergies    Current Outpatient Medications:     DULoxetine (CYMBALTA) 30 MG capsule, Take 1 capsule by mouth every morning., Disp: , Rfl:     ergocalciferol (ERGOCALCIFEROL) 50,000 unit Cap, Take 1 capsule (50,000 Units total) by mouth every 7 days., Disp: 8 capsule, Rfl: 0    eszopiclone (LUNESTA) 3 mg Tab, Take 1 tablet (3 mg total) by mouth once daily., Disp: 30 tablet, Rfl:  "5    fexofenadine (ALLEGRA) 60 MG tablet, Take 1 tablet by mouth Twice daily. Twice a day, Disp: , Rfl:     ibuprofen (ADVIL,MOTRIN) 600 MG tablet, Take 1 tablet (600 mg total) by mouth every 6 (six) hours as needed for Pain. (Patient not taking: Reported on 3/13/2023), Disp: 20 tablet, Rfl: 0    LIDOcaine (LIDODERM) 5 %, Place 1 patch onto the skin once daily. Remove & Discard patch within 12 hours or as directed by MD, Disp: 15 patch, Rfl: 0    methocarbamoL (ROBAXIN) 500 MG Tab, Take 500 mg by mouth as needed. Pt unsure of dosage, Disp: , Rfl:     phentermine 37.5 MG capsule, Take 1 capsule (37.5 mg total) by mouth every morning., Disp: 30 capsule, Rfl: 0    Review of Systems   Constitutional:  Positive for appetite change. Negative for chills, fever and unexpected weight change (wt down 7lbs since last month).   Respiratory:  Negative for cough, shortness of breath and wheezing.    Cardiovascular:  Negative for chest pain, palpitations and leg swelling.   Gastrointestinal:  Negative for abdominal pain.   Genitourinary:  Negative for dysuria and frequency.   Neurological:  Negative for dizziness and headaches.        Objective:      Vitals:    05/16/23 1547   BP: 128/88   Pulse: 72   SpO2: 96%   Weight: 122 kg (269 lb)   Height: 5' 8.2" (1.732 m)     Physical Exam  Vitals reviewed.   Constitutional:       General: She is not in acute distress.     Appearance: She is well-developed. She is obese.   HENT:      Head: Normocephalic and atraumatic.   Cardiovascular:      Rate and Rhythm: Normal rate and regular rhythm.      Heart sounds: No murmur heard.  Pulmonary:      Effort: Pulmonary effort is normal. No respiratory distress.      Breath sounds: Normal breath sounds.   Abdominal:      Palpations: Abdomen is soft.   Musculoskeletal:      Right lower leg: No edema.      Left lower leg: No edema.   Skin:     General: Skin is warm and dry.      Findings: No rash.   Neurological:      General: No focal deficit " present.      Mental Status: She is alert and oriented to person, place, and time.      Motor: No weakness.      Gait: Gait normal.      Comments: Negative phalens, +tinels right wrist         Assessment:       1. Morbid obesity due to excess calories    2. Carpal tunnel syndrome of right wrist           Plan:       1. Morbid obesity due to excess calories   -will prescribe one more month of phentermine. Encouraged to work on portion size and healthy choices to continue once she's off phentermine    2. Carpal tunnel syndrome of right wrist   -recommend night splint- call if worsening or no improvement    Follow up in about 6 months (around 11/16/2023).          5/16/2023 Mariam Soriano, KAYLEEN

## 2023-10-06 RX ORDER — ESZOPICLONE 3 MG/1
3 TABLET, FILM COATED ORAL DAILY
Qty: 30 TABLET | Refills: 5 | Status: SHIPPED | OUTPATIENT
Start: 2023-10-06 | End: 2023-10-18 | Stop reason: SDUPTHER

## 2023-10-06 NOTE — TELEPHONE ENCOUNTER
----- Message from Janneth Novak sent at 10/6/2023 10:08 AM CDT -----  Pt needs refill on lunesta   [facility]   [phone]

## 2023-10-18 RX ORDER — ESZOPICLONE 3 MG/1
3 TABLET, FILM COATED ORAL DAILY
Qty: 30 TABLET | Refills: 5 | Status: SHIPPED | OUTPATIENT
Start: 2023-10-18

## 2023-10-18 NOTE — TELEPHONE ENCOUNTER
----- Message from Janneth Novak sent at 10/18/2023  2:39 PM CDT -----  Pt is calling refill lunesta   [facility]   [phone]

## 2023-11-22 ENCOUNTER — TELEPHONE (OUTPATIENT)
Dept: FAMILY MEDICINE | Facility: CLINIC | Age: 38
End: 2023-11-22

## 2023-11-22 NOTE — TELEPHONE ENCOUNTER
----- Message from Janneth Novak sent at 11/22/2023 11:41 AM CST -----  Pt would like to be seen today for her regular check up. She is off work today   [phone]

## 2023-11-22 NOTE — TELEPHONE ENCOUNTER
Contacted patient and informed her there is no availability today. Patient is already scheduled on 11/27/23 and will be keeping her appointment.

## 2023-11-27 ENCOUNTER — OFFICE VISIT (OUTPATIENT)
Dept: FAMILY MEDICINE | Facility: CLINIC | Age: 38
End: 2023-11-27
Payer: COMMERCIAL

## 2023-11-27 VITALS
SYSTOLIC BLOOD PRESSURE: 132 MMHG | OXYGEN SATURATION: 97 % | DIASTOLIC BLOOD PRESSURE: 74 MMHG | HEIGHT: 68 IN | BODY MASS INDEX: 42.59 KG/M2 | WEIGHT: 281 LBS | HEART RATE: 85 BPM

## 2023-11-27 DIAGNOSIS — Z00.00 ANNUAL PHYSICAL EXAM: ICD-10-CM

## 2023-11-27 DIAGNOSIS — F32.0 MILD MAJOR DEPRESSION: Primary | ICD-10-CM

## 2023-11-27 DIAGNOSIS — E66.01 MORBID OBESITY DUE TO EXCESS CALORIES: ICD-10-CM

## 2023-11-27 DIAGNOSIS — F51.01 PRIMARY INSOMNIA: ICD-10-CM

## 2023-11-27 DIAGNOSIS — E55.9 VITAMIN D DEFICIENCY: ICD-10-CM

## 2023-11-27 PROCEDURE — 1159F PR MEDICATION LIST DOCUMENTED IN MEDICAL RECORD: ICD-10-PCS | Mod: CPTII,S$GLB,, | Performed by: NURSE PRACTITIONER

## 2023-11-27 PROCEDURE — 3008F PR BODY MASS INDEX (BMI) DOCUMENTED: ICD-10-PCS | Mod: CPTII,S$GLB,, | Performed by: NURSE PRACTITIONER

## 2023-11-27 PROCEDURE — 3075F PR MOST RECENT SYSTOLIC BLOOD PRESS GE 130-139MM HG: ICD-10-PCS | Mod: CPTII,S$GLB,, | Performed by: NURSE PRACTITIONER

## 2023-11-27 PROCEDURE — 1160F PR REVIEW ALL MEDS BY PRESCRIBER/CLIN PHARMACIST DOCUMENTED: ICD-10-PCS | Mod: CPTII,S$GLB,, | Performed by: NURSE PRACTITIONER

## 2023-11-27 PROCEDURE — 3078F DIAST BP <80 MM HG: CPT | Mod: CPTII,S$GLB,, | Performed by: NURSE PRACTITIONER

## 2023-11-27 PROCEDURE — 99213 OFFICE O/P EST LOW 20 MIN: CPT | Mod: S$GLB,,, | Performed by: NURSE PRACTITIONER

## 2023-11-27 PROCEDURE — 3078F PR MOST RECENT DIASTOLIC BLOOD PRESSURE < 80 MM HG: ICD-10-PCS | Mod: CPTII,S$GLB,, | Performed by: NURSE PRACTITIONER

## 2023-11-27 PROCEDURE — 3075F SYST BP GE 130 - 139MM HG: CPT | Mod: CPTII,S$GLB,, | Performed by: NURSE PRACTITIONER

## 2023-11-27 PROCEDURE — 3044F HG A1C LEVEL LT 7.0%: CPT | Mod: CPTII,S$GLB,, | Performed by: NURSE PRACTITIONER

## 2023-11-27 PROCEDURE — 3044F PR MOST RECENT HEMOGLOBIN A1C LEVEL <7.0%: ICD-10-PCS | Mod: CPTII,S$GLB,, | Performed by: NURSE PRACTITIONER

## 2023-11-27 PROCEDURE — 99213 PR OFFICE/OUTPT VISIT, EST, LEVL III, 20-29 MIN: ICD-10-PCS | Mod: S$GLB,,, | Performed by: NURSE PRACTITIONER

## 2023-11-27 PROCEDURE — 1160F RVW MEDS BY RX/DR IN RCRD: CPT | Mod: CPTII,S$GLB,, | Performed by: NURSE PRACTITIONER

## 2023-11-27 PROCEDURE — 1159F MED LIST DOCD IN RCRD: CPT | Mod: CPTII,S$GLB,, | Performed by: NURSE PRACTITIONER

## 2023-11-27 PROCEDURE — 3008F BODY MASS INDEX DOCD: CPT | Mod: CPTII,S$GLB,, | Performed by: NURSE PRACTITIONER

## 2023-11-27 RX ORDER — DULOXETIN HYDROCHLORIDE 30 MG/1
30 CAPSULE, DELAYED RELEASE ORAL EVERY MORNING
Qty: 90 CAPSULE | Refills: 3 | Status: SHIPPED | OUTPATIENT
Start: 2023-11-27 | End: 2025-01-10

## 2023-11-27 NOTE — PROGRESS NOTES
SUBJECTIVE:    Patient ID: Janneth Nascimento is a 38 y.o. female.    Chief Complaint: Follow-up (No bottles//Pt is here for a check up and medication refill//Decline flu vaccine//JOHN )    Pt here for regular f/u-     Pt reports overall she's been doing well. No c/o's today    Reports anxiety/depression stable on duloxetine- admits wasn't seeing her therapist for awhile because of insurance change but plans on trying to get set back up    UTD with pap with Dr. Sharp        No visits with results within 6 Month(s) from this visit.   Latest known visit with results is:   Office Visit on 03/13/2023   Component Date Value Ref Range Status    WBC 04/10/2023 6.0  3.8 - 10.8 Thousand/uL Final    RBC 04/10/2023 4.37  3.80 - 5.10 Million/uL Final    Hemoglobin 04/10/2023 11.8  11.7 - 15.5 g/dL Final    Hematocrit 04/10/2023 37.5  35.0 - 45.0 % Final    MCV 04/10/2023 85.8  80.0 - 100.0 fL Final    MCH 04/10/2023 27.0  27.0 - 33.0 pg Final    MCHC 04/10/2023 31.5 (L)  32.0 - 36.0 g/dL Final    RDW 04/10/2023 14.9  11.0 - 15.0 % Final    Platelets 04/10/2023 423 (H)  140 - 400 Thousand/uL Final    MPV 04/10/2023 9.7  7.5 - 12.5 fL Final    Neutrophils, Abs 04/10/2023 3,336  1,500 - 7,800 cells/uL Final    Lymph # 04/10/2023 1,848  850 - 3,900 cells/uL Final    Mono # 04/10/2023 582  200 - 950 cells/uL Final    Eos # 04/10/2023 162  15 - 500 cells/uL Final    Baso # 04/10/2023 72  0 - 200 cells/uL Final    Neutrophils Relative 04/10/2023 55.6  % Final    Lymph % 04/10/2023 30.8  % Final    Mono % 04/10/2023 9.7  % Final    Eosinophil % 04/10/2023 2.7  % Final    Basophil % 04/10/2023 1.2  % Final    Glucose 04/10/2023 101 (H)  65 - 99 mg/dL Final    BUN 04/10/2023 8  7 - 25 mg/dL Final    Creatinine 04/10/2023 0.69  0.50 - 0.97 mg/dL Final    eGFR 04/10/2023 114  > OR = 60 mL/min/1.73m2 Final    BUN/Creatinine Ratio 04/10/2023 NOT APPLICABLE  6 - 22 (calc) Final    Sodium 04/10/2023 138  135 - 146 mmol/L Final    Potassium  04/10/2023 4.3  3.5 - 5.3 mmol/L Final    Chloride 04/10/2023 104  98 - 110 mmol/L Final    CO2 04/10/2023 28  20 - 32 mmol/L Final    Calcium 04/10/2023 9.2  8.6 - 10.2 mg/dL Final    Total Protein 04/10/2023 7.1  6.1 - 8.1 g/dL Final    Albumin 04/10/2023 4.2  3.6 - 5.1 g/dL Final    Globulin, Total 04/10/2023 2.9  1.9 - 3.7 g/dL (calc) Final    Albumin/Globulin Ratio 04/10/2023 1.4  1.0 - 2.5 (calc) Final    Total Bilirubin 04/10/2023 0.3  0.2 - 1.2 mg/dL Final    Alkaline Phosphatase 04/10/2023 52  31 - 125 U/L Final    AST 04/10/2023 15  10 - 30 U/L Final    ALT 04/10/2023 13  6 - 29 U/L Final    Hemoglobin A1C 04/10/2023 5.3  <5.7 % of total Hgb Final    Cholesterol 04/10/2023 154  <200 mg/dL Final    HDL 04/10/2023 44 (L)  > OR = 50 mg/dL Final    Triglycerides 04/10/2023 205 (H)  <150 mg/dL Final    LDL Cholesterol 04/10/2023 80  mg/dL (calc) Final    HDL/Cholesterol Ratio 04/10/2023 3.5  <5.0 (calc) Final    Non HDL Chol. (LDL+VLDL) 04/10/2023 110  <130 mg/dL (calc) Final    TSH w/reflex to FT4 04/10/2023 1.98  mIU/L Final    Color, UA 04/10/2023 DARK YELLOW  YELLOW Final    Appearance, UA 04/10/2023 CLOUDY (A)  CLEAR Final    Specific Gravity, UA 04/10/2023 1.026  1.001 - 1.035 Final    pH, UA 04/10/2023 6.0  5.0 - 8.0 Final    Glucose, UA 04/10/2023 NEGATIVE  NEGATIVE Final    Bilirubin, UA 04/10/2023 NEGATIVE  NEGATIVE Final    Ketones, UA 04/10/2023 NEGATIVE  NEGATIVE Final    Occult Blood UA 04/10/2023 3+ (A)  NEGATIVE Final    Protein, UA 04/10/2023 2+ (A)  NEGATIVE Final    Nitrite, UA 04/10/2023 NEGATIVE  NEGATIVE Final    Leukocytes, UA 04/10/2023 1+ (A)  NEGATIVE Final    WBC Casts, UA 04/10/2023 0-5  < OR = 5 /HPF Final    RBC Casts, UA 04/10/2023 20-40 (A)  < OR = 2 /HPF Final    Squam Epithel, UA 04/10/2023 0-5  < OR = 5 /HPF Final    Bacteria, UA 04/10/2023 NONE SEEN  NONE SEEN /HPF Final    Ca Oxalate Mirlande, UA 04/10/2023 MANY (A)  NONE OR FEW /HPF Final    Hyaline Casts, UA 04/10/2023 NONE  SEEN  NONE SEEN /LPF Final    Service Cmt: 04/10/2023    Final    Reflexive Urine Culture 04/10/2023    Final    Urine Culture, Routine 04/10/2023    Final    Creatinine, Urine 04/10/2023 226  20 - 275 mg/dL Final    Microalb, Ur 04/10/2023 18.9  See Note: mg/dL Final    Microalb/Creat Ratio 04/10/2023 84 (H)  <30 mcg/mg creat Final    Vitamin D, 25-OH, Total 04/10/2023 18 (L)  30 - 100 ng/mL Final       Past Medical History:   Diagnosis Date    Allergy     Insomnia      Past Surgical History:   Procedure Laterality Date    ceserean section  2007     Family History   Problem Relation Age of Onset    Hypertension Father     Arthritis Paternal Grandmother     COPD Paternal Grandmother     Diabetes Paternal Grandmother        All of your core healthy metrics are met.      The 10-year CVD risk score (Samantha, et al., 2008) is: 2.3%    Values used to calculate the score:      Age: 38 years      Sex: Female      Diabetic: No      Tobacco smoker: No      Systolic Blood Pressure: 132 mmHg      Is BP treated: No      HDL Cholesterol: 44 mg/dL      Total Cholesterol: 154 mg/dL     Marital Status: Single  Alcohol History:  reports that she does not currently use alcohol.  Tobacco History:  reports that she has never smoked. She has never been exposed to tobacco smoke. She has never used smokeless tobacco.  Drug History:  reports that she does not currently use drugs.    Health Maintenance Topics with due status: Not Due       Topic Last Completion Date    Hemoglobin A1c (Prediabetes) 04/10/2023    Cervical Cancer Screening 04/12/2023       There is no immunization history on file for this patient.    Review of patient's allergies indicates:  No Known Allergies    Current Outpatient Medications:     eszopiclone (LUNESTA) 3 mg Tab, Take 1 tablet (3 mg total) by mouth once daily., Disp: 30 tablet, Rfl: 5    DULoxetine (CYMBALTA) 30 MG capsule, Take 1 capsule (30 mg total) by mouth every morning., Disp: 90 capsule, Rfl: 3     "fexofenadine (ALLEGRA) 60 MG tablet, Take 1 tablet by mouth Twice daily. Twice a day, Disp: , Rfl:     ibuprofen (ADVIL,MOTRIN) 600 MG tablet, Take 1 tablet (600 mg total) by mouth every 6 (six) hours as needed for Pain. (Patient not taking: Reported on 3/13/2023), Disp: 20 tablet, Rfl: 0    LIDOcaine (LIDODERM) 5 %, Place 1 patch onto the skin once daily. Remove & Discard patch within 12 hours or as directed by MD, Disp: 15 patch, Rfl: 0    methocarbamoL (ROBAXIN) 500 MG Tab, Take 500 mg by mouth as needed. Pt unsure of dosage, Disp: , Rfl:     Review of Systems   Constitutional:  Negative for appetite change, chills, fever and unexpected weight change.   HENT:  Negative for sore throat and trouble swallowing.    Eyes:  Negative for visual disturbance.   Respiratory:  Negative for cough, shortness of breath and wheezing.    Cardiovascular:  Negative for chest pain, palpitations and leg swelling.   Gastrointestinal:  Negative for abdominal pain, constipation and diarrhea.   Genitourinary:  Negative for dysuria, frequency, hematuria and menstrual problem.   Musculoskeletal:  Negative for back pain and gait problem.   Skin:  Negative for rash.   Neurological:  Negative for dizziness, syncope, speech difficulty, numbness and headaches.   Psychiatric/Behavioral:  Positive for dysphoric mood (stable on med). The patient is not nervous/anxious.           Objective:      Vitals:    11/27/23 1443   BP: 132/74   Pulse: 85   SpO2: 97%   Weight: 127.5 kg (281 lb)   Height: 5' 8.2" (1.732 m)     Physical Exam  Vitals reviewed.   Constitutional:       General: She is not in acute distress.     Appearance: She is well-developed. She is obese.   HENT:      Head: Normocephalic and atraumatic.      Right Ear: Tympanic membrane and ear canal normal.      Left Ear: Tympanic membrane and ear canal normal.   Neck:      Vascular: No carotid bruit.   Cardiovascular:      Rate and Rhythm: Normal rate and regular rhythm.      Heart sounds: " No murmur heard.  Pulmonary:      Effort: Pulmonary effort is normal. No respiratory distress.      Breath sounds: Normal breath sounds. No wheezing or rales.   Abdominal:      General: There is no distension.      Palpations: Abdomen is soft.      Tenderness: There is no abdominal tenderness.   Musculoskeletal:      Cervical back: Neck supple.      Right lower leg: No edema.      Left lower leg: No edema.   Lymphadenopathy:      Cervical: No cervical adenopathy.   Skin:     General: Skin is warm and dry.      Findings: No rash.   Neurological:      General: No focal deficit present.      Mental Status: She is alert and oriented to person, place, and time.      Gait: Gait normal.   Psychiatric:         Mood and Affect: Mood normal.           Assessment:       1. Mild major depression    2. Vitamin D deficiency    3. Annual physical exam    4. Primary insomnia    5. Morbid obesity due to excess calories           Plan:       1. Mild major depression  -stable on med  -     DULoxetine (CYMBALTA) 30 MG capsule; Take 1 capsule (30 mg total) by mouth every morning.  Dispense: 90 capsule; Refill: 3    2. Vitamin D deficiency  -admits hasn't been taking OTC vitamin D- recommend restarting 2000 units daily, recheck on next labs  -     Vitamin D; Future; Expected date: 11/27/2023    3. Annual physical exam  -     CBC Auto Differential; Future; Expected date: 11/27/2023  -     Comprehensive Metabolic Panel; Future; Expected date: 11/27/2023  -     Lipid Panel; Future; Expected date: 11/27/2023  -     Urinalysis, Reflex to Urine Culture Urine, Clean Catch; Future; Expected date: 11/27/2023  -     TSH w/reflex to FT4; Future; Expected date: 11/27/2023    4. Primary insomnia   -stable on med    5. Morbid obesity   -encouraged pt to work on improving diet, cut back on processed foods and regular exercise    Follow up in about 6 months (around 5/27/2024) for annual visit, Labs before next visit.          Counseled on age and gender  appropriate medical preventative services, including cancer screenings, immunizations, overall nutritional health, need for a consistent exercise regimen and an overall push towards maintaining a vigorous and active lifestyle.      11/27/2023 Mariam Soriano NP

## 2024-04-09 ENCOUNTER — HOSPITAL ENCOUNTER (EMERGENCY)
Facility: HOSPITAL | Age: 39
Discharge: HOME | End: 2024-04-10
Attending: STUDENT IN AN ORGANIZED HEALTH CARE EDUCATION/TRAINING PROGRAM
Payer: COMMERCIAL

## 2024-04-09 DIAGNOSIS — R55 SYNCOPE: ICD-10-CM

## 2024-04-09 DIAGNOSIS — N93.9 VAGINAL BLEEDING: ICD-10-CM

## 2024-04-09 LAB
ABO + RH BLD: NORMAL
ALBUMIN SERPL BCP-MCNC: 4.3 G/DL (ref 3.5–5.2)
ALP SERPL-CCNC: 51 U/L (ref 55–135)
ALT SERPL W/O P-5'-P-CCNC: 14 U/L (ref 10–44)
ANION GAP SERPL CALC-SCNC: 7 MMOL/L (ref 8–16)
AST SERPL-CCNC: 15 U/L (ref 10–40)
B-HCG UR QL: NEGATIVE
BACTERIA #/AREA URNS HPF: ABNORMAL /HPF
BASOPHILS # BLD AUTO: 0.07 K/UL (ref 0–0.2)
BASOPHILS NFR BLD: 0.8 % (ref 0–1.9)
BILIRUB SERPL-MCNC: 0.3 MG/DL (ref 0.1–1)
BILIRUB UR QL STRIP: NEGATIVE
BLD GP AB SCN CELLS X3 SERPL QL: NORMAL
BUN SERPL-MCNC: 11 MG/DL (ref 6–20)
CALCIUM SERPL-MCNC: 8.9 MG/DL (ref 8.7–10.5)
CHLORIDE SERPL-SCNC: 108 MMOL/L (ref 95–110)
CLARITY UR: ABNORMAL
CO2 SERPL-SCNC: 27 MMOL/L (ref 23–29)
COLOR UR: ABNORMAL
CREAT SERPL-MCNC: 0.8 MG/DL (ref 0.5–1.4)
CTP QC/QA: YES
DIFFERENTIAL METHOD BLD: ABNORMAL
EOSINOPHIL # BLD AUTO: 0.2 K/UL (ref 0–0.5)
EOSINOPHIL NFR BLD: 1.7 % (ref 0–8)
ERYTHROCYTE [DISTWIDTH] IN BLOOD BY AUTOMATED COUNT: 15.8 % (ref 11.5–14.5)
EST. GFR  (NO RACE VARIABLE): >60 ML/MIN/1.73 M^2
GLUCOSE SERPL-MCNC: 93 MG/DL (ref 70–110)
GLUCOSE UR QL STRIP: NEGATIVE
HCT VFR BLD AUTO: 25 % (ref 37–48.5)
HGB BLD-MCNC: 7.6 G/DL (ref 12–16)
HGB UR QL STRIP: ABNORMAL
HYALINE CASTS #/AREA URNS LPF: 0 /LPF
IMM GRANULOCYTES # BLD AUTO: 0.06 K/UL (ref 0–0.04)
IMM GRANULOCYTES NFR BLD AUTO: 0.7 % (ref 0–0.5)
KETONES UR QL STRIP: NEGATIVE
LEUKOCYTE ESTERASE UR QL STRIP: NEGATIVE
LYMPHOCYTES # BLD AUTO: 1.8 K/UL (ref 1–4.8)
LYMPHOCYTES NFR BLD: 20.7 % (ref 18–48)
MCH RBC QN AUTO: 25.2 PG (ref 27–31)
MCHC RBC AUTO-ENTMCNC: 30.4 G/DL (ref 32–36)
MCV RBC AUTO: 83 FL (ref 82–98)
MICROSCOPIC COMMENT: ABNORMAL
MONOCYTES # BLD AUTO: 1 K/UL (ref 0.3–1)
MONOCYTES NFR BLD: 11.5 % (ref 4–15)
NEUTROPHILS # BLD AUTO: 5.7 K/UL (ref 1.8–7.7)
NEUTROPHILS NFR BLD: 64.6 % (ref 38–73)
NITRITE UR QL STRIP: NEGATIVE
NRBC BLD-RTO: 0 /100 WBC
PH UR STRIP: 8 [PH] (ref 5–8)
PLATELET # BLD AUTO: 455 K/UL (ref 150–450)
PMV BLD AUTO: 9.5 FL (ref 9.2–12.9)
POTASSIUM SERPL-SCNC: 4.2 MMOL/L (ref 3.5–5.1)
PROT SERPL-MCNC: 7.1 G/DL (ref 6–8.4)
PROT UR QL STRIP: ABNORMAL
RBC # BLD AUTO: 3.02 M/UL (ref 4–5.4)
RBC #/AREA URNS HPF: >100 /HPF (ref 0–4)
SODIUM SERPL-SCNC: 142 MMOL/L (ref 136–145)
SP GR UR STRIP: 1.03 (ref 1–1.03)
SPECIMEN OUTDATE: NORMAL
SQUAMOUS #/AREA URNS HPF: 3 /HPF
URN SPEC COLLECT METH UR: ABNORMAL
UROBILINOGEN UR STRIP-ACNC: NEGATIVE EU/DL
WBC # BLD AUTO: 8.76 K/UL (ref 3.9–12.7)
WBC #/AREA URNS HPF: 27 /HPF (ref 0–5)

## 2024-04-09 PROCEDURE — 85025 COMPLETE CBC W/AUTO DIFF WBC: CPT | Performed by: NURSE PRACTITIONER

## 2024-04-09 PROCEDURE — 81001 URINALYSIS AUTO W/SCOPE: CPT | Performed by: NURSE PRACTITIONER

## 2024-04-09 PROCEDURE — 87086 URINE CULTURE/COLONY COUNT: CPT | Performed by: NURSE PRACTITIONER

## 2024-04-09 PROCEDURE — 80053 COMPREHEN METABOLIC PANEL: CPT | Performed by: NURSE PRACTITIONER

## 2024-04-09 PROCEDURE — 93005 ELECTROCARDIOGRAM TRACING: CPT | Performed by: GENERAL PRACTICE

## 2024-04-09 PROCEDURE — 36415 COLL VENOUS BLD VENIPUNCTURE: CPT | Performed by: NURSE PRACTITIONER

## 2024-04-09 PROCEDURE — 81025 URINE PREGNANCY TEST: CPT | Performed by: NURSE PRACTITIONER

## 2024-04-09 PROCEDURE — 99285 EMERGENCY DEPT VISIT HI MDM: CPT | Mod: 25

## 2024-04-09 PROCEDURE — 93010 ELECTROCARDIOGRAM REPORT: CPT | Mod: ,,, | Performed by: GENERAL PRACTICE

## 2024-04-09 PROCEDURE — 86850 RBC ANTIBODY SCREEN: CPT | Performed by: NURSE PRACTITIONER

## 2024-04-09 NOTE — FIRST PROVIDER EVALUATION
Emergency Department TeleTriage Encounter Note      CHIEF COMPLAINT    Chief Complaint   Patient presents with    Loss of Consciousness     Had a syncope event while at work - witnessed by co-worker who stated that she hit her head and was unconscious for a few minutes - there was no confusion upon waking - requires clearance to return to work - hx of anemia       VITAL SIGNS   Initial Vitals [04/09/24 1820]   BP Pulse Resp Temp SpO2   (!) 157/96 92 18 98.1 °F (36.7 °C) 96 %      MAP       --            ALLERGIES    Review of patient's allergies indicates:  No Known Allergies    PROVIDER TRIAGE NOTE  TeleTriage Note: Janneth Nascimento, a nontoxic/well appearing, 38 y.o. female, presented to the ED with c/o syncopal episode at work causing her to fall. Co-worker states she hit her head on the floor and was unconscious for a few minutes. Hx of anemia. Feels fine right now. Currently having dysfunctional uterine bleeding and being seen by gynecologist to try to control bleeding. 1 previous episode of syncope 2.5 years ago.     All ED beds are full at present; patient notified of this status.  Patient seen and medically screened by Nurse Practitioner via teletriage. Orders initiated at triage to expedite care.  Patient is stable to return to the waiting room and will be placed in an ED bed when available.  Care will be transferred to an alternate provider when patient has been placed in an Exam Room from the [facility] for physical exam, additional orders, and disposition.  6:27 PM Haleigh Crook DNP, FNP-C        ORDERS  Labs Reviewed   CBC W/ AUTO DIFFERENTIAL   COMPREHENSIVE METABOLIC PANEL       ED Orders (720h ago, onward)      Start Ordered     Status Ordering Provider    04/09/24 1831 04/09/24 1830  Urinalysis, Reflex to Urine Culture Urine, Clean Catch  STAT         Ordered HALEIGH CROOK    04/09/24 1830 04/09/24 1830  POCT urine pregnancy  Once         Ordered HALEIGH CROOK    04/09/24 1828 04/09/24 1828   Insert peripheral IV  Continuous         Ordered FAYE, PAMELA WOLFFRenate    04/09/24 1828 04/09/24 1828  CBC auto differential  STAT         Ordered FAYE, PAMELA WOLFFRenate    04/09/24 1828 04/09/24 1828  Comprehensive metabolic panel  STAT         Ordered FAYE, PAMELA WOLFFRenate    04/09/24 1828 04/09/24 1828  EKG 12-lead  Once         Ordered FAYEPAMELARenate              Virtual Visit Note: The provider triage portion of this emergency department evaluation and documentation was performed via [employer], a HIPAA-compliant telemedicine application, in concert with a tele-presenter in the room. A face to face patient evaluation with one of my colleagues will occur once the patient is placed in an emergency department room.      DISCLAIMER: This note was prepared with [employer] voice recognition transcription software. Garbled syntax, mangled pronouns, and other bizarre constructions may be attributed to that software system.

## 2024-04-09 NOTE — Clinical Note
"Janneth"Luis Nascimento was seen and treated in our emergency department on 4/9/2024.  She may return to work on 04/11/2024.  Ms. Nascimento may return to work in full capacity on 4/11. 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      If you have any questions or concerns, please don't hesitate to call.      Syeda Sampson MD"

## 2024-04-10 VITALS
SYSTOLIC BLOOD PRESSURE: 179 MMHG | BODY MASS INDEX: 42.44 KG/M2 | HEART RATE: 84 BPM | RESPIRATION RATE: 18 BRPM | TEMPERATURE: 98 F | HEIGHT: 68 IN | WEIGHT: 280 LBS | DIASTOLIC BLOOD PRESSURE: 103 MMHG | OXYGEN SATURATION: 100 %

## 2024-04-10 RX ORDER — FERROUS GLUCONATE 324(38)MG
324 TABLET ORAL
Qty: 30 TABLET | Refills: 0 | Status: SHIPPED | OUTPATIENT
Start: 2024-04-10 | End: 2024-05-10

## 2024-04-10 NOTE — DISCHARGE INSTRUCTIONS
As discussed please start taking your newly prescribed birth control and continue iron pills.  Please follow-up with Dr. Sharp in 1-2 weeks.  If for some reason you are unable to follow up with him please follow-up with your primary care doctor.    Return to the emergency department for worsening bleeding, difficulty breathing, fevers, chest pain, lightheadedness, passing out or any other concerns you have.

## 2024-04-10 NOTE — ED PROVIDER NOTES
Encounter Date: 4/9/2024       History     Chief Complaint   Patient presents with    Loss of Consciousness     Had a syncope event while at work - witnessed by co-worker who stated that she hit her head and was unconscious for a few minutes - there was no confusion upon waking - requires clearance to return to work - hx of anemia     Janneth Nascimento is a 38 y.o. female with hx of CARMENCITA, on CPAP, PTSD, iron-deficiency anemia presenting to the emergency department after syncopal episode.  Just prior to arrival while patient was at work she had witnessed syncopal episode striking head on linoleum floor.  States she felt dazed aura just prior to the incident and had immediate return to baseline.  Of note patient has had persistent vaginal bleeding for over a year.  Is being worked up outpatient and has ultrasound scheduled next week, and was supposed to have biopsy scheduled and has not happened yet.  Additionally patient's gyn doctor prescribed birth control today which she has not started. Denies chest pain, shortness of breath, palpations, headache, vision changes or any other episodes of syncope in the last 2 weeks.    Review of patient's allergies indicates:  No Known Allergies  Past Medical History:   Diagnosis Date    Allergy     Insomnia      Past Surgical History:   Procedure Laterality Date    ceserean section  2007     Family History   Problem Relation Age of Onset    Hypertension Father     Arthritis Paternal Grandmother     COPD Paternal Grandmother     Diabetes Paternal Grandmother      Social History     Tobacco Use    Smoking status: Never     Passive exposure: Never    Smokeless tobacco: Never   Substance Use Topics    Alcohol use: Not Currently    Drug use: Not Currently     Review of Systems  As per HPI  Physical Exam     Initial Vitals [04/09/24 1820]   BP Pulse Resp Temp SpO2   (!) 157/96 92 18 98.1 °F (36.7 °C) 96 %      MAP       --         Physical Exam    Nursing note and vitals  reviewed.  Constitutional: She appears well-developed and well-nourished.   HENT:   Head: Normocephalic and atraumatic.   Right Ear: External ear normal.   Left Ear: External ear normal.   Nose: Nose normal.   Mouth/Throat: Oropharynx is clear and moist.   Eyes: Conjunctivae and EOM are normal. Pupils are equal, round, and reactive to light.   Neck: Neck supple.   Normal range of motion.  Cardiovascular:  Normal rate.           Pulmonary/Chest: No respiratory distress.   Abdominal: Abdomen is soft. She exhibits no distension. There is no abdominal tenderness. There is no rebound and no guarding.   Genitourinary:    Vagina normal.      Genitourinary Comments: Pelvic exam with closed cervical os, small amount of blood coming from os.  No pooling or vaginal canal trauma.     Musculoskeletal:         General: No edema. Normal range of motion.      Cervical back: Normal range of motion and neck supple.     Neurological: She is oriented to person, place, and time. GCS score is 15. GCS eye subscore is 4. GCS verbal subscore is 5. GCS motor subscore is 6.   Skin: Skin is warm and dry. Capillary refill takes less than 2 seconds.   Psychiatric: She has a normal mood and affect. Her behavior is normal. Judgment and thought content normal.         ED Course   Procedures  Labs Reviewed   CBC W/ AUTO DIFFERENTIAL - Abnormal; Notable for the following components:       Result Value    RBC 3.02 (*)     Hemoglobin 7.6 (*)     Hematocrit 25.0 (*)     MCH 25.2 (*)     MCHC 30.4 (*)     RDW 15.8 (*)     Platelets 455 (*)     Immature Granulocytes 0.7 (*)     Immature Grans (Abs) 0.06 (*)     All other components within normal limits   COMPREHENSIVE METABOLIC PANEL - Abnormal; Notable for the following components:    Alkaline Phosphatase 51 (*)     Anion Gap 7 (*)     All other components within normal limits   URINALYSIS, REFLEX TO URINE CULTURE - Abnormal; Notable for the following components:    Color, UA Orange (*)     Appearance,  UA Cloudy (*)     Protein, UA 1+ (*)     Occult Blood UA 3+ (*)     All other components within normal limits    Narrative:     Specimen Source->Urine   URINALYSIS MICROSCOPIC - Abnormal; Notable for the following components:    RBC, UA >100 (*)     WBC, UA 27 (*)     All other components within normal limits    Narrative:     Specimen Source->Urine   CULTURE, URINE   POCT URINE PREGNANCY   TYPE & SCREEN          Imaging Results              US Transvaginal Non OB (Final result)  Result time 04/10/24 00:01:27      Final result by Shanell Burgos MD (04/10/24 00:01:27)                   Impression:      Thickened endometrium.  Correlate with phase of menstrual cycle.    Right corpus luteal or hemorrhagic ovarian cyst.      Electronically signed by: Shanell Burgos  Date:    04/10/2024  Time:    00:01               Narrative:    EXAMINATION:  US TRANSVAGINAL NON OB    CLINICAL HISTORY:  Abnormal uterine and vaginal bleeding, unspecified    TECHNIQUE:  Transvaginal and transabdominal female pelvic ultrasound    COMPARISON:  None    FINDINGS:  Uterus measures 10.0 x 5.6 x 6.2 cm.  No masses.    Nabothian cysts within the cervix.    Thickened endometrium measuring 22 mm.    Right ovary measures 3.1 x 3.0 x 3.3 cm.  Small thick-walled right adnexal cyst measuring 2.6 x 2.0 x 2.4 cm.    Left ovary is not seen.                                       Medications - No data to display  Medical Decision Making  38-year-old female presenting the ED with syncope.  Vitals on arrival notable for hypertension, otherwise within normal limits.  On exam patient is generally well-appearing, alert and interactive.  Mucous membranes are moist.  Patient is in no respiratory distress.  Abdomen is soft and nontender.  exam with closed cervical os, blood vaginal canal with no trauma and no rapid we pulling.  Differential diagnosis includes but is not limited to abnormal uterine bleeding, fibroids, acute blood loss anemia,  symptomatic anemia, vasovagal syncope.     Amount and/or Complexity of Data Reviewed  External Data Reviewed: labs and notes.     Details: GYN Dr. Sharp, no visits since 2023.   HgB 2022 9.8     Labs: ordered. Decision-making details documented in ED Course.  Radiology: ordered and independent interpretation performed. Decision-making details documented in ED Course.    Risk  OTC drugs.  Decision regarding hospitalization.  Diagnosis or treatment significantly limited by social determinants of health.  Risk Details: Patient has been lost to follow-up secondary to changes with insurance.  Recently started new job and we will have new insurance starting May 1st.  Patient called gyn today and received prescription for birth control to assist with bleeding.  Patient does not have any shortness breath, lightheadedness or hemodynamic instability, do not believe symptomatic anemia is likely.  Ambulating without difficulty.  Given otherwise well appearance patient discharged home with strict return precautions.  Patient verbalized understanding and we will resume iron supplementation and follow up with gyn.              Attending Attestation:   Physician Attestation Statement for Resident:  As the supervising MD   I agree with the above history.  -:   As the supervising MD I agree with the above PE.     As the supervising MD I agree with the above treatment, course, plan, and disposition.                    ED Course as of 04/10/24 0227   Tue Apr 09, 2024 2201 Hemoglobin(!): 7.6  Last measured 1 year ago at 11, consistent with persistent vaginal bleeding. [SM]   2201 Platelet Count(!): 455  Has been elevated at last several measured, unclear cause. [SM]   2201 CMP clinically unremarkable. [SM]   2201 hCG Qualitative, Urine: Negative [SM]   2201 UA with protein and blood, no signs of infection.  Consistent with vaginal bleeding.  No dysuria. [SM]   Wed Apr 10, 2024   0006 US Transvaginal Non OB  Thickened endometrium,  right ovarian cyst, nabothians.  Left ovary not visualized. [SM]      ED Course User Index  [SM] Syeda Sampson MD                           Clinical Impression:  Final diagnoses:  [R55] Syncope  [N93.9] Vaginal bleeding          ED Disposition Condition    Discharge Stable          ED Prescriptions       Medication Sig Dispense Start Date End Date Auth. Provider    ferrous gluconate (FERGON) 324 MG tablet Take 1 tablet (324 mg total) by mouth daily with breakfast. 30 tablet 4/10/2024 5/10/2024 Syeda Sampson MD          Follow-up Information       Follow up With Specialties Details Why Contact Info Additional Information    [facility] - Emergency Dept Emergency Medicine Go to  As needed, If symptoms worsen [address] [facility] [id number] [phone] 1st floor    Hayder Sharp MD Obstetrics and Gynecology Schedule an appointment as soon as possible for a visit in 2 weeks For follow up appointment. [address] [facility]  [address] [address] [facility] [address]  [phone]                Syeda Sampson MD  Resident  04/10/24 0009       Joe Kenyon MD  04/10/24 0227

## 2024-04-12 LAB — BACTERIA UR CULT: NO GROWTH

## 2024-04-29 LAB
OHS QRS DURATION: 74 MS
OHS QTC CALCULATION: 441 MS

## 2024-08-07 DIAGNOSIS — Z12.31 OTHER SCREENING MAMMOGRAM: ICD-10-CM

## 2024-08-12 NOTE — TELEPHONE ENCOUNTER
----- Message from Leigh Coppola sent at 8/12/2024  8:27 AM CDT -----  Pt just received new insurance with [employer] and needing 9:30 am appointment or later if possible any day of the week.   Needs a refill eszopiclone  [facility] [place]   [phone]

## 2024-08-13 NOTE — TELEPHONE ENCOUNTER
Shayy Reid Staff  Caller: Unspecified (Today,  8:28 AM)  The patient is returning Tatiana's call from yesterday. Pt's # [phone]

## 2024-08-14 RX ORDER — ESZOPICLONE 3 MG/1
3 TABLET, FILM COATED ORAL DAILY
Qty: 30 TABLET | Refills: 0 | Status: SHIPPED | OUTPATIENT
Start: 2024-08-14

## 2024-08-14 NOTE — TELEPHONE ENCOUNTER
We have tried to call this pt multiple times, to scheduled an office visit. Pt has not try to call our office back. Can I sign this office?       Pt also would like a refill on her eszopiclone. Last office visit 11/27/2023. No up coming office visit.

## 2024-08-15 NOTE — TELEPHONE ENCOUNTER
Left message on voice mail, verbalizing the Mariam sent in a 30 day supply of Lunesta, but no further refills till she is seen in office

## 2024-08-19 ENCOUNTER — TELEPHONE (OUTPATIENT)
Dept: FAMILY MEDICINE | Facility: CLINIC | Age: 39
End: 2024-08-19
Payer: COMMERCIAL

## 2024-08-19 NOTE — TELEPHONE ENCOUNTER
----- Message from Shayy Reid sent at 8/19/2024  8:08 AM CDT -----  The patient has an appointment on 08/27/2024. She wants to get in sooner. She wants a nurse to call her. Pt's # [phone]

## 2024-08-21 ENCOUNTER — OFFICE VISIT (OUTPATIENT)
Dept: FAMILY MEDICINE | Facility: CLINIC | Age: 39
End: 2024-08-21
Payer: COMMERCIAL

## 2024-08-21 VITALS
HEIGHT: 68 IN | HEART RATE: 100 BPM | WEIGHT: 293 LBS | OXYGEN SATURATION: 98 % | SYSTOLIC BLOOD PRESSURE: 156 MMHG | BODY MASS INDEX: 44.41 KG/M2 | DIASTOLIC BLOOD PRESSURE: 98 MMHG

## 2024-08-21 DIAGNOSIS — D50.0 IRON DEFICIENCY ANEMIA DUE TO CHRONIC BLOOD LOSS: ICD-10-CM

## 2024-08-21 DIAGNOSIS — F32.0 MILD MAJOR DEPRESSION: ICD-10-CM

## 2024-08-21 DIAGNOSIS — N93.8 DUB (DYSFUNCTIONAL UTERINE BLEEDING): ICD-10-CM

## 2024-08-21 DIAGNOSIS — I10 PRIMARY HYPERTENSION: Primary | ICD-10-CM

## 2024-08-21 DIAGNOSIS — F51.01 PRIMARY INSOMNIA: ICD-10-CM

## 2024-08-21 DIAGNOSIS — E66.01 MORBID OBESITY DUE TO EXCESS CALORIES: ICD-10-CM

## 2024-08-21 RX ORDER — MEGESTROL ACETATE 40 MG/1
40 TABLET ORAL 2 TIMES DAILY
COMMUNITY
Start: 2024-07-16

## 2024-08-21 RX ORDER — ESZOPICLONE 3 MG/1
3 TABLET, FILM COATED ORAL DAILY
Qty: 30 TABLET | Refills: 5 | Status: SHIPPED | OUTPATIENT
Start: 2024-08-21

## 2024-08-21 RX ORDER — LOSARTAN POTASSIUM 50 MG/1
50 TABLET ORAL DAILY
Qty: 90 TABLET | Refills: 3 | Status: SHIPPED | OUTPATIENT
Start: 2024-08-21 | End: 2025-08-21

## 2024-08-21 RX ORDER — DULOXETIN HYDROCHLORIDE 30 MG/1
30 CAPSULE, DELAYED RELEASE ORAL EVERY MORNING
Qty: 90 CAPSULE | Refills: 3 | Status: SHIPPED | OUTPATIENT
Start: 2024-08-21 | End: 2025-10-05

## 2024-08-21 RX ORDER — FERROUS GLUCONATE 324(38)MG
324 TABLET ORAL
COMMUNITY
Start: 2024-07-03

## 2024-08-21 NOTE — PATIENT INSTRUCTIONS
Start losartan 50mg one tablet daily- recommend you monitor blood pressure at home and keep a log. Come back in 2 weeks

## 2024-08-21 NOTE — PROGRESS NOTES
SUBJECTIVE:    Patient ID: Janneth Nascimento is a 39 y.o. female.    Chief Complaint: Follow-up (No bottles//Pt is here for a check up and medication refills//Pt will scheduled mammo later//Pt would like to discuss paper work and possible letter//JOHN )    Pt here for regular f/u- anxiety/depression. Reports insurance changed to established with new PCP for a short time.    Seen in ER in April after suffering syncopal episode at work.  Hemoglobin in ER 7.6- pt reports around that time she was having some heavy menstrual bleeding that had been going on for months. Was seeing Dr. Sharp, gyn and he started her on megestrol after syncopal episode and pt reports no further bleeding since then. He also has her on daily iron supplement- she has f/u with him next week.    Pt reports she's feeling okay however since on megestrol she has gained 25lbs- BP was elevated during her ER visit.    Pt reports is trying to start  school soon and needs medical clearance letter. 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        Admission on 04/09/2024, Discharged on 04/10/2024   Component Date Value Ref Range Status    WBC 04/09/2024 8.76  3.90 - 12.70 K/uL Final    RBC 04/09/2024 3.02 (L)  4.00 - 5.40 M/uL Final    Hemoglobin 04/09/2024 7.6 (L)  12.0 - 16.0 g/dL Final    Hematocrit 04/09/2024 25.0 (L)  37.0 - 48.5 % Final    MCV 04/09/2024 83  82 - 98 fL Final    MCH 04/09/2024 25.2 (L)  27.0 - 31.0 pg Final    MCHC 04/09/2024 30.4 (L)  32.0 - 36.0 g/dL Final    RDW 04/09/2024 15.8 (H)  11.5 - 14.5 % Final    Platelets 04/09/2024 455 (H)  150 - 450 K/uL Final    MPV 04/09/2024 9.5  9.2 - 12.9 fL Final    Immature Granulocytes 04/09/2024 0.7 (H)  0.0 - 0.5 % Final    Gran # (ANC) 04/09/2024 5.7  1.8 - 7.7 K/uL Final    Immature Grans (Abs) 04/09/2024 0.06 (H)  0.00 - 0.04 K/uL Final    Lymph # 04/09/2024 1.8  1.0 - 4.8 K/uL Final    Mono # 04/09/2024 1.0  0.3 - 1.0 K/uL Final    Eos # 04/09/2024 0.2  0.0 -  0.5 K/uL Final    Baso # 04/09/2024 0.07  0.00 - 0.20 K/uL Final    nRBC 04/09/2024 0  0 /100 WBC Final    Gran % 04/09/2024 64.6  38.0 - 73.0 % Final    Lymph % 04/09/2024 20.7  18.0 - 48.0 % Final    Mono % 04/09/2024 11.5  4.0 - 15.0 % Final    Eosinophil % 04/09/2024 1.7  0.0 - 8.0 % Final    Basophil % 04/09/2024 0.8  0.0 - 1.9 % Final    Differential Method 04/09/2024 Automated   Final    Sodium 04/09/2024 142  136 - 145 mmol/L Final    Potassium 04/09/2024 4.2  3.5 - 5.1 mmol/L Final    Chloride 04/09/2024 108  95 - 110 mmol/L Final    CO2 04/09/2024 27  23 - 29 mmol/L Final    Glucose 04/09/2024 93  70 - 110 mg/dL Final    BUN 04/09/2024 11  6 - 20 mg/dL Final    Creatinine 04/09/2024 0.8  0.5 - 1.4 mg/dL Final    Calcium 04/09/2024 8.9  8.7 - 10.5 mg/dL Final    Total Protein 04/09/2024 7.1  6.0 - 8.4 g/dL Final    Albumin 04/09/2024 4.3  3.5 - 5.2 g/dL Final    Total Bilirubin 04/09/2024 0.3  0.1 - 1.0 mg/dL Final    Alkaline Phosphatase 04/09/2024 51 (L)  55 - 135 U/L Final    AST 04/09/2024 15  10 - 40 U/L Final    ALT 04/09/2024 14  10 - 44 U/L Final    eGFR 04/09/2024 >60.0  >60 mL/min/1.73 m^2 Final    Anion Gap 04/09/2024 7 (L)  8 - 16 mmol/L Final    QRS Duration 04/09/2024 74  ms Final    OHS QTC Calculation 04/09/2024 441  ms Final    POC Preg Test, Ur 04/09/2024 Negative  Negative Final     Acceptable 04/09/2024 Yes   Final    Specimen UA 04/09/2024 Urine, Clean Catch   Final    Color, UA 04/09/2024 Orange (A)  Yellow, Straw, Nora Final    Appearance, UA 04/09/2024 Cloudy (A)  Clear Final    pH, UA 04/09/2024 8.0  5.0 - 8.0 Final    Specific Gravity, UA 04/09/2024 1.030  1.005 - 1.030 Final    Protein, UA 04/09/2024 1+ (A)  Negative Final    Glucose, UA 04/09/2024 Negative  Negative Final    Ketones, UA 04/09/2024 Negative  Negative Final    Bilirubin (UA) 04/09/2024 Negative  Negative Final    Occult Blood UA 04/09/2024 3+ (A)  Negative Final    Nitrite, UA 04/09/2024 Negative   Negative Final    Urobilinogen, UA 04/09/2024 Negative  Negative EU/dL Final    Leukocytes, UA 04/09/2024 Negative  Negative Final    RBC, UA 04/09/2024 >100 (H)  0 - 4 /hpf Final    WBC, UA 04/09/2024 27 (H)  0 - 5 /hpf Final    Bacteria 04/09/2024 None  None-Occ /hpf Final    Squam Epithel, UA 04/09/2024 3  /hpf Final    Hyaline Casts, UA 04/09/2024 0  0-1/lpf /lpf Final    Microscopic Comment 04/09/2024 SEE COMMENT   Final    Urine Culture, Routine 04/09/2024 No growth   Final    Group & Rh 04/09/2024 O POS   Final    Indirect Luis Eduardo 04/09/2024 NEG   Final    Specimen Outdate 04/09/2024 04/12/2024 23:59   Final       Past Medical History:   Diagnosis Date    Allergy     Insomnia      Past Surgical History:   Procedure Laterality Date    ceserean section  2007     Family History   Problem Relation Name Age of Onset    Hypertension Father      Arthritis Paternal Grandmother      COPD Paternal Grandmother      Diabetes Paternal Grandmother         Tests to Keep You Healthy    Mammogram: ORDERED BUT NOT SCHEDULED  Cervical Cancer Screening: Met on 4/12/2023  Last Blood Pressure <= 139/89 (8/21/2024): NO      The 10-year CVD risk score (D'Agostino, et al., 2008) is: 5.3%    Values used to calculate the score:      Age: 39 years      Sex: Female      Diabetic: No      Tobacco smoker: No      Systolic Blood Pressure: 156 mmHg      Is BP treated: Yes      HDL Cholesterol: 44 mg/dL      Total Cholesterol: 154 mg/dL     Marital Status: Single  Alcohol History:  reports that she does not currently use alcohol.  Tobacco History:  reports that she has never smoked. She has never been exposed to tobacco smoke. She has never used smokeless tobacco.  Drug History:  reports that she does not currently use drugs.    Health Maintenance Topics with due status: Not Due       Topic Last Completion Date    Influenza Vaccine 03/13/2023    Cervical Cancer Screening 04/12/2023       There is no immunization history on file for this  patient.    Review of patient's allergies indicates:  No Known Allergies    Current Outpatient Medications:     ferrous gluconate (FERGON) 324 MG tablet, Take 324 mg by mouth daily with breakfast., Disp: , Rfl:     fexofenadine (ALLEGRA) 60 MG tablet, Take 1 tablet by mouth Twice daily. Twice a day, Disp: , Rfl:     megestroL (MEGACE) 40 MG Tab, Take 40 mg by mouth 2 (two) times daily., Disp: , Rfl:     DULoxetine (CYMBALTA) 30 MG capsule, Take 1 capsule (30 mg total) by mouth every morning., Disp: 90 capsule, Rfl: 3    eszopiclone (LUNESTA) 3 mg Tab, Take 1 tablet (3 mg total) by mouth once daily., Disp: 30 tablet, Rfl: 5    ibuprofen (ADVIL,MOTRIN) 600 MG tablet, Take 1 tablet (600 mg total) by mouth every 6 (six) hours as needed for Pain. (Patient not taking: Reported on 3/13/2023), Disp: 20 tablet, Rfl: 0    LIDOcaine (LIDODERM) 5 %, Place 1 patch onto the skin once daily. Remove & Discard patch within 12 hours or as directed by MD, Disp: 15 patch, Rfl: 0    losartan (COZAAR) 50 MG tablet, Take 1 tablet (50 mg total) by mouth once daily., Disp: 90 tablet, Rfl: 3    methocarbamoL (ROBAXIN) 500 MG Tab, Take 500 mg by mouth as needed. Pt unsure of dosage (Patient not taking: Reported on 8/21/2024), Disp: , Rfl:     Review of Systems   Constitutional:  Positive for unexpected weight change. Negative for appetite change, chills and fever.   HENT:  Negative for sore throat and trouble swallowing.    Eyes:  Negative for visual disturbance.   Respiratory:  Negative for cough, shortness of breath and wheezing.    Cardiovascular:  Negative for chest pain, palpitations and leg swelling.   Gastrointestinal:  Negative for abdominal pain, constipation and diarrhea.   Genitourinary:  Negative for dysuria, frequency, hematuria and menstrual problem (bleeding stopped since being on megestrol).   Musculoskeletal:  Negative for back pain and gait problem.   Skin:  Negative for rash.   Neurological:  Negative for dizziness,  "syncope, speech difficulty, numbness and headaches.   Psychiatric/Behavioral:  Positive for dysphoric mood (stable on med). The patient is not nervous/anxious.           Objective:      Vitals:    08/21/24 1609 08/21/24 1618   BP: (!) 160/100 (!) 156/98   Pulse: 100    SpO2: 98%    Weight: (!) 138.8 kg (306 lb)    Height: 5' 8" (1.727 m)      Physical Exam  Vitals reviewed.   Constitutional:       General: She is not in acute distress.     Appearance: She is well-developed. She is obese.   HENT:      Head: Normocephalic and atraumatic.      Right Ear: Tympanic membrane and ear canal normal.      Left Ear: Tympanic membrane and ear canal normal.   Neck:      Vascular: No carotid bruit.   Cardiovascular:      Rate and Rhythm: Normal rate and regular rhythm.      Heart sounds: No murmur heard.  Pulmonary:      Effort: Pulmonary effort is normal. No respiratory distress.      Breath sounds: Normal breath sounds. No wheezing or rales.   Abdominal:      General: There is no distension.      Palpations: Abdomen is soft.      Tenderness: There is no abdominal tenderness.   Musculoskeletal:      Cervical back: Neck supple.      Right lower leg: No edema.      Left lower leg: No edema.   Lymphadenopathy:      Cervical: No cervical adenopathy.   Skin:     General: Skin is warm and dry.      Findings: No rash.   Neurological:      General: No focal deficit present.      Mental Status: She is alert and oriented to person, place, and time.      Gait: Gait normal.   Psychiatric:         Mood and Affect: Mood normal.           Assessment:       1. Primary hypertension    2. Mild major depression    3. Primary insomnia    4. Iron deficiency anemia due to chronic blood loss    5. DUB (dysfunctional uterine bleeding)    6. Morbid obesity due to excess calories           Plan:       1. Primary hypertension  -BP elevated today, discussed with pt given BP reading would recommend starting medication while she works on improving her diet " and weight loss. Recommend monitoring BP at home and return in 2 weeks for BP recheck. Advised given Bp today I cannot clear her for  school  -     losartan (COZAAR) 50 MG tablet; Take 1 tablet (50 mg total) by mouth once daily.  Dispense: 90 tablet; Refill: 3    2. Mild major depression  -pt reports doing well on med  -     DULoxetine (CYMBALTA) 30 MG capsule; Take 1 capsule (30 mg total) by mouth every morning.  Dispense: 90 capsule; Refill: 3    3. Primary insomnia  -stable on med  -     eszopiclone (LUNESTA) 3 mg Tab; Take 1 tablet (3 mg total) by mouth once daily.  Dispense: 30 tablet; Refill: 5    4. Iron deficiency anemia due to chronic blood loss   -Hgb down to 7.2 in April, sine then bleeding has stopped and she's on daily iron supplement. Advised I would recommend repeat labs    5. DUB (dysfunctional uterine bleeding)   -resolved off megestrol however having significant wt gain on med. Pt has f/u with dr. Sharp next week to discuss next step- he has mentioned uterine ablation already and advised given med side effects I would recommend considering ablation    6. Morbid obesity due to excess calories   -reviewed wt gain and encouraged her to work on improving diet- cut out processed sguars/carbs and also recommend daily exercise in form of walking for 10-15 minutes to help with BP also    Follow up in about 2 weeks (around 9/4/2024) for HTN.          Counseled on age and gender appropriate medical preventative services, including cancer screenings, immunizations, overall nutritional health, need for a consistent exercise regimen and an overall push towards maintaining a vigorous and active lifestyle.      8/23/2024 Mariam Soriano NP

## 2024-08-23 ENCOUNTER — TELEPHONE (OUTPATIENT)
Dept: FAMILY MEDICINE | Facility: CLINIC | Age: 39
End: 2024-08-23
Payer: COMMERCIAL

## 2024-08-23 PROBLEM — D50.0 IRON DEFICIENCY ANEMIA DUE TO CHRONIC BLOOD LOSS: Status: ACTIVE | Noted: 2024-08-23

## 2024-08-23 PROBLEM — I10 PRIMARY HYPERTENSION: Status: ACTIVE | Noted: 2024-08-23

## 2024-08-23 PROBLEM — F51.01 PRIMARY INSOMNIA: Status: ACTIVE | Noted: 2024-08-23

## 2024-08-23 PROBLEM — N93.8 DUB (DYSFUNCTIONAL UTERINE BLEEDING): Status: ACTIVE | Noted: 2024-08-23

## 2024-08-23 NOTE — TELEPHONE ENCOUNTER
Did you want her back for a regular visit or nurse visit in 2 weeks? She's wanting to come in next week due to insurance.

## 2024-08-23 NOTE — TELEPHONE ENCOUNTER
----- Message from Leigh Coppola sent at 8/23/2024  9:02 AM CDT -----  Pt will loose health benefits. Would like to know if she will be able to receive a sooner appointment within August.   [phone]

## 2024-08-26 ENCOUNTER — TELEPHONE (OUTPATIENT)
Dept: FAMILY MEDICINE | Facility: CLINIC | Age: 39
End: 2024-08-26
Payer: COMMERCIAL

## 2024-08-26 NOTE — TELEPHONE ENCOUNTER
----- Message from Mariam Hussein sent at 8/26/2024  9:18 AM CDT -----  Pt needs to speak with the nurse concerning a visit before the end of August. Pt #[phone]

## 2024-08-28 ENCOUNTER — CLINICAL SUPPORT (OUTPATIENT)
Dept: FAMILY MEDICINE | Facility: CLINIC | Age: 39
End: 2024-08-28
Payer: COMMERCIAL

## 2024-08-28 VITALS — OXYGEN SATURATION: 99 % | HEART RATE: 116 BPM | DIASTOLIC BLOOD PRESSURE: 90 MMHG | SYSTOLIC BLOOD PRESSURE: 140 MMHG

## 2024-08-28 DIAGNOSIS — I10 PRIMARY HYPERTENSION: Primary | ICD-10-CM

## 2024-08-28 NOTE — PROGRESS NOTES
Pt is here for a nurse visit, blood pressure check. Pt is taken 50 mg  of Losartan daily. Per Mariam, blood pressure is better, but still high. Increasing Losartan to 100 mg once daily. Pt acknowledged understanding. RTC in 2 weeks for a b/p check.

## 2024-08-28 NOTE — LETTER
[address] [facility] [address]  [place] [address]  Phone: [phone]   [phone]                        MD Ramesh Gresham MD Chequita Williams, MD Matthew Bassett, PA-C Linda Melerine, NP Jodi Powell, NP Jennifer Shields, NP      Date: 08/28/2024        Patient: Janneth Nascimento  YOB: 1985      To Whom It May Concern:    Ms. Janneth Nascimento is currently under my care for insomnia, mild depression and hypertension. These conditions are stable on lunesta 3mg nightly for insomnia, losartan 100mg daily for hypertension and duloxetine 30mg daily for depression.    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    Please contact the office for any further questions.    Sincerely,         Mariam Soriano NP

## 2024-09-16 DIAGNOSIS — F51.01 PRIMARY INSOMNIA: ICD-10-CM

## 2024-09-16 RX ORDER — ESZOPICLONE 3 MG/1
3 TABLET, FILM COATED ORAL DAILY
Qty: 30 TABLET | Refills: 5 | Status: SHIPPED | OUTPATIENT
Start: 2024-09-16

## 2024-09-16 NOTE — TELEPHONE ENCOUNTER
----- Message from Shayy Reid sent at 9/16/2024  9:53 AM CDT -----  Refill Lunesta [facility] [address] [place]. Pt's # [phone]

## 2024-10-10 DIAGNOSIS — I10 PRIMARY HYPERTENSION: Primary | ICD-10-CM

## 2024-10-10 RX ORDER — LOSARTAN POTASSIUM 100 MG/1
100 TABLET ORAL DAILY
Qty: 90 TABLET | Refills: 0 | Status: SHIPPED | OUTPATIENT
Start: 2024-10-10 | End: 2025-10-10

## 2024-10-10 NOTE — TELEPHONE ENCOUNTER
----- Message from Leigh sent at 10/10/2024  2:11 PM CDT -----  Losartan dose has doubled but [facility] saying pt is refilling prescription too soon. Please help pt refill this script.   [facility]- [address] [place]  [phone]

## 2024-10-16 ENCOUNTER — TELEPHONE (OUTPATIENT)
Dept: FAMILY MEDICINE | Facility: CLINIC | Age: 39
End: 2024-10-16
Payer: COMMERCIAL

## 2024-10-16 DIAGNOSIS — F51.01 PRIMARY INSOMNIA: ICD-10-CM

## 2024-10-16 RX ORDER — ESZOPICLONE 3 MG/1
3 TABLET, FILM COATED ORAL DAILY
Qty: 30 TABLET | Refills: 5 | Status: SHIPPED | OUTPATIENT
Start: 2024-10-16

## 2024-10-16 NOTE — TELEPHONE ENCOUNTER
----- Message from Leigh sent at 10/16/2024  3:24 PM CDT -----  Pt calling to check if there was an update on prescription request.   Pt# [phone]

## 2024-10-16 NOTE — TELEPHONE ENCOUNTER
----- Message from José Miguel Root sent at 10/16/2024  8:55 AM CDT -----  Pt is requesting refills on eszopiclone please send to [facility] [address] [place] [address]    Pt# [phone]

## 2025-01-26 NOTE — TELEPHONE ENCOUNTER
----- Message from Mariam Soriano NP sent at 3/13/2023  3:35 PM CDT -----  Please set up phentermine 37.5mg #30 to be sent in by dr. Resendez  
Pt needs a prescription for phentermine 37.5 mg. Pt was seen today as a new patient.   
Warm/Dry

## 2025-02-12 ENCOUNTER — TELEPHONE (OUTPATIENT)
Dept: FAMILY MEDICINE | Facility: CLINIC | Age: 40
End: 2025-02-12

## 2025-02-12 NOTE — TELEPHONE ENCOUNTER
----- Message from Leigh sent at 2/12/2025  2:27 PM CST -----  Patient asking to be seen for medication refills. Patient is only in Thursday and Friday.   [phone]

## 2025-04-14 ENCOUNTER — OFFICE VISIT (OUTPATIENT)
Dept: URGENT CARE | Facility: CLINIC | Age: 40
End: 2025-04-14
Payer: OTHER MISCELLANEOUS

## 2025-04-14 VITALS
TEMPERATURE: 98 F | RESPIRATION RATE: 22 BRPM | SYSTOLIC BLOOD PRESSURE: 153 MMHG | DIASTOLIC BLOOD PRESSURE: 72 MMHG | HEIGHT: 68 IN | HEART RATE: 118 BPM | BODY MASS INDEX: 44.41 KG/M2 | OXYGEN SATURATION: 98 % | WEIGHT: 293 LBS

## 2025-04-14 DIAGNOSIS — S30.0XXA CONTUSION OF SACRUM, INITIAL ENCOUNTER: Primary | ICD-10-CM

## 2025-04-14 DIAGNOSIS — M53.3 PAIN IN THE COCCYX: ICD-10-CM

## 2025-04-14 RX ORDER — NAPROXEN 500 MG/1
500 TABLET ORAL EVERY 12 HOURS PRN
COMMUNITY
Start: 2025-02-26

## 2025-04-14 RX ORDER — NAPROXEN 500 MG/1
500 TABLET ORAL EVERY 12 HOURS PRN
Qty: 20 TABLET | Refills: 0 | Status: SHIPPED | OUTPATIENT
Start: 2025-04-14 | End: 2025-04-24

## 2025-04-14 NOTE — PROGRESS NOTES
"Subjective:      Patient ID: Janneth Nascimento is a 39 y.o. female.    Vitals:  height is 5' 8" (1.727 m) and weight is 149.7 kg (330 lb) (abnormal). Her temperature is 98 °F (36.7 °C). Her blood pressure is 153/72 (abnormal) and her pulse is 118 (abnormal). Her respiration is 22 (abnormal) and oxygen saturation is 98%.     Chief Complaint: No chief complaint on file.    39-year-old ambulatory white female who presents today accompanied by RN case manager for complaint of tailbone pain.  She explains that she is a  and on January 8, 2025 she was working on her [employer] [address] when she slipped in the ice and landed on her buttocks.  She explains that she has been seen by [facility] occupational health for this several times.  She explains that she was on muscle relaxers and naproxen initially.  She explains that she is just taking naproxen now.   is enquiring about a referral to Orthopedics.  The patient reports that she can not sit for prolonged periods of time without pain, she can not lift without pain, bending over is painful, as well as walking for long periods of time.        Musculoskeletal:  Positive for pain.   Skin:  Negative for erythema.      Objective:     Physical Exam   Constitutional: She is oriented to person, place, and time.  Non-toxic appearance. She does not appear ill. No distress. obesity  HENT:   Head: Normocephalic and atraumatic.   Eyes: Conjunctivae are normal. Extraocular movement intact   Cardiovascular: Normal rate, regular rhythm, normal heart sounds and normal pulses.   Pulmonary/Chest: Effort normal and breath sounds normal.   Abdominal: Normal appearance.   Musculoskeletal: Normal range of motion.         General: Tenderness present. No swelling, deformity or signs of injury. Normal range of motion.      Comments: Positive tenderness to palpation of coccyx.   Neurological: no focal deficit. She is alert and oriented to person, place, and time. She displays no " weakness. No sensory deficit. Gait normal.   Skin: Skin is warm, dry, not diaphoretic, not pale and no rash. No bruising, No erythema and No lesion no jaundice  Psychiatric: Her behavior is normal.   Vitals reviewed.      Assessment:     1. Contusion of sacrum, initial encounter    2. Pain in the coccyx        Plan:   NOTE:  Patient is agreeable with returned to work as tolerated.    Contusion of sacrum, initial encounter  -     Ambulatory referral/consult to Orthopedics  -     naproxen (NAPROSYN) 500 MG tablet; Take 1 tablet (500 mg total) by mouth every 12 (twelve) hours as needed (Pain).  Dispense: 20 tablet; Refill: 0    Pain in the coccyx  -     XR SACRUM AND COCCYX; Future; Expected date: 04/14/2025  -     Ambulatory referral/consult to Orthopedics    INSTRUCTIONS  Continue naproxen as prescribed.  Rest.  Ice to affected area.  Follow up with Orthopedics as scheduled.

## 2025-04-28 ENCOUNTER — TELEPHONE (OUTPATIENT)
Dept: SPINE | Facility: CLINIC | Age: 40
End: 2025-04-28
Payer: COMMERCIAL

## 2025-05-13 ENCOUNTER — OFFICE VISIT (OUTPATIENT)
Dept: SPINE | Facility: CLINIC | Age: 40
End: 2025-05-13
Payer: OTHER MISCELLANEOUS

## 2025-05-13 VITALS — WEIGHT: 293 LBS | BODY MASS INDEX: 44.41 KG/M2 | HEIGHT: 68 IN

## 2025-05-13 DIAGNOSIS — M53.3 TRAUMATIC COCCYDYNIA: Primary | ICD-10-CM

## 2025-05-13 RX ORDER — NAPROXEN 500 MG/1
500 TABLET ORAL EVERY 12 HOURS PRN
Qty: 60 TABLET | Refills: 1 | Status: SHIPPED | OUTPATIENT
Start: 2025-05-13

## 2025-05-13 NOTE — PROGRESS NOTES
"  SUBJECTIVE:    Patient ID: Janneth Nascimento is a 39 y.o. female.    Chief Complaint: Tailbone Pain    This is a 39-year-old woman who sees Mariam Soriano nurse practitioner for her primary care.  History of hypertension otherwise denies any chronic major medical problems.  No cancer history.  No significant history of back problems.  She slipped and fell on ice while at work in January of this year and since then has been bothered by persistent pain over the tailbone.  She went to physical therapy for several weeks and has shown some improvement but is still not satisfied with her quality of life.  She has been taking naproxen which does help.  I personally reviewed X-rays of the sacrum and coccyx done 04/14/2025 show no acute findings.  Current pain level is 3/10 but at times as high as 7/10 and interferes with her quality of life in terms of activities of daily living recreation social activities and occupation.  She drives for a living.  At this particular time she can not tolerate sitting more than 1/2-1 hour.  She uses a cushion sometimes          Past Medical History:   Diagnosis Date    Allergy     Insomnia      Social History[1]  Past Surgical History:   Procedure Laterality Date    ceserean section  2007     Family History   Problem Relation Name Age of Onset    Hypertension Father      Arthritis Paternal Grandmother      COPD Paternal Grandmother      Diabetes Paternal Grandmother       Vitals:    05/13/25 1058   Weight: (!) 149.7 kg (330 lb 0.5 oz)   Height: 5' 8" (1.727 m)       Review of Systems   Constitutional:  Negative for chills, diaphoresis, fatigue, fever and unexpected weight change.   HENT:  Negative for trouble swallowing.    Eyes:  Negative for visual disturbance.   Respiratory:  Negative for shortness of breath.    Cardiovascular:  Negative for chest pain.   Gastrointestinal:  Negative for abdominal pain, constipation, nausea and vomiting.   Genitourinary:  Negative for difficulty " urinating.   Musculoskeletal:  Negative for arthralgias, back pain, gait problem, joint swelling, myalgias, neck pain and neck stiffness.   Neurological:  Negative for dizziness, speech difficulty, weakness, light-headedness, numbness and headaches.          Objective:      Physical Exam  Neurological:      Mental Status: She is alert and oriented to person, place, and time.      Comments: A female  is present in the room  She is awake and in no acute distress  No point tenderness or palpable masses about the coccyx  Forward flexion and extension of the lumbar spine are normal and painless  She can heel and toe walk normally  Reflexes- +1-+2 reflexes at the following:   C5-Biceps   C6-Brachioradialis   C7-Triceps   L3/4-Patellar   S1-Achilles   Marvin sign is negative bilaterally  Strength testing- 5/5 strength in the following muscle groups:  C5-Elbow flexion  C6-Wrist extension  C7-Elbow extension  C8-Finger flexion  T1-Finger abduction  L2-Hip flexion  L3-Knee extension  L4-Ankle dorsiflexion  L5-Great toe extension  S1-Ankle plantar flexion                    Assessment:       1. Traumatic coccydynia           Plan:     She has a nonfocal neurological examination and no historical red flags.  She has persistent tailbone pain despite a trial of offloading and physical therapy.  I would like to get an MRI of the area to rule out occult fracture.  She is interested in more physical therapy which is a good idea.  Consider ganglion impar block.  Continue naproxen.  We wrote her a note to return to work but she needs to take a break every hour.  Follow up with me after therapy      Traumatic coccydynia  -     MRI Sacrum/Coccyx (Bony) W/O Contrast; Future; Expected date: 05/13/2025  -     Ambulatory Referral/Consult to Physical Therapy    Other orders  -     naproxen (NAPROSYN) 500 MG tablet; Take 1 tablet (500 mg total) by mouth every 12 (twelve) hours as needed (Pain).  Dispense: 60 tablet; Refill:  1               [1]   Social History  Socioeconomic History    Marital status: Single   Tobacco Use    Smoking status: Never     Passive exposure: Never    Smokeless tobacco: Never   Substance and Sexual Activity    Alcohol use: Not Currently    Drug use: Not Currently    Sexual activity: Not Currently

## 2025-06-26 ENCOUNTER — OFFICE VISIT (OUTPATIENT)
Dept: FAMILY MEDICINE | Facility: CLINIC | Age: 40
End: 2025-06-26

## 2025-06-26 VITALS
BODY MASS INDEX: 44.41 KG/M2 | SYSTOLIC BLOOD PRESSURE: 138 MMHG | DIASTOLIC BLOOD PRESSURE: 86 MMHG | WEIGHT: 293 LBS | OXYGEN SATURATION: 99 % | HEIGHT: 68 IN | HEART RATE: 95 BPM

## 2025-06-26 DIAGNOSIS — N93.8 DUB (DYSFUNCTIONAL UTERINE BLEEDING): ICD-10-CM

## 2025-06-26 DIAGNOSIS — F51.01 PRIMARY INSOMNIA: ICD-10-CM

## 2025-06-26 DIAGNOSIS — Z00.00 ANNUAL PHYSICAL EXAM: Primary | ICD-10-CM

## 2025-06-26 DIAGNOSIS — M53.3 TRAUMATIC COCCYDYNIA: ICD-10-CM

## 2025-06-26 DIAGNOSIS — Z12.31 SCREENING MAMMOGRAM, ENCOUNTER FOR: ICD-10-CM

## 2025-06-26 DIAGNOSIS — F32.0 MILD MAJOR DEPRESSION: ICD-10-CM

## 2025-06-26 DIAGNOSIS — E66.01 MORBID OBESITY DUE TO EXCESS CALORIES: ICD-10-CM

## 2025-06-26 DIAGNOSIS — I10 PRIMARY HYPERTENSION: ICD-10-CM

## 2025-06-26 RX ORDER — DULOXETIN HYDROCHLORIDE 30 MG/1
30 CAPSULE, DELAYED RELEASE ORAL EVERY MORNING
Qty: 90 CAPSULE | Refills: 3 | Status: SHIPPED | OUTPATIENT
Start: 2025-06-26 | End: 2026-08-10

## 2025-06-26 RX ORDER — ESZOPICLONE 3 MG/1
3 TABLET, FILM COATED ORAL DAILY
Qty: 30 TABLET | Refills: 5 | Status: SHIPPED | OUTPATIENT
Start: 2025-06-26

## 2025-06-26 RX ORDER — HYDROCHLOROTHIAZIDE 25 MG/1
TABLET ORAL
Qty: 30 TABLET | Refills: 5 | Status: SHIPPED | OUTPATIENT
Start: 2025-06-26

## 2025-06-26 RX ORDER — LOSARTAN POTASSIUM 100 MG/1
100 TABLET ORAL DAILY
Qty: 90 TABLET | Refills: 3 | Status: SHIPPED | OUTPATIENT
Start: 2025-06-26 | End: 2026-06-26

## 2025-06-26 NOTE — PROGRESS NOTES
SUBJECTIVE:    Patient ID: Janneth Nascimento is a 39 y.o. female.    Chief Complaint: Medication Refill (No bottles// Pt is here for medication refills//KMS)      History of Present Illness    Patient presents today for annual physical and follow up hypertension. Pt last seen here 8/2024.    Pt reorts was working as  and traveling but suffered a fall in January landing on buttocks. Reports since then has had chronic coccyx pain. Had negative xrays and saw Dr. Rothman last month and he ordered MRI which is being done later today. Has been going to PT and overall pain has improved.    She takes Losartan 100 mg daily, typically at night, and reports medication adherence. Blood pressure elevated during today's visit.    She reports ongoing vaginal bleeding managed with Megestrol. She's previously seen Dr. Sharp who recommends uterine ablation but she can't have surgery right now d/t lack of insurance. She has gained weight on megace, wt up 27lbs since August 2024. She reports when she ran out of medication, bleeding recurred, and upon medication refill, bleeding subsequently stopped. Denies dizziness, CP or SOB.    She reports she had full set of labs a few months in [place]- needed her losartan refilled so someone up there while traveling.    She is currently taking Duloxetine 30 mg daily, Lunesta for sleep, iron tablet daily, Allegra for allergies, Losartan 100 mg daily, and Naproxen as needed for elbow. Methocarbamol is not currently being taken.    Due for mammogram      ROS:  General: no fever, no chills, no fatigue, + weight gain, no weight loss  Eyes: no vision changes, no redness, no discharge  ENT: no ear pain, no nasal congestion, no sore throat  Cardiovascular: no chest pain, no palpitations, no lower extremity edema  Respiratory: no cough, no shortness of breath  Gastrointestinal: no abdominal pain, no nausea, no vomiting, no diarrhea, no constipation, no blood in stool  Genitourinary: no  dysuria, no hematuria, no frequency  Musculoskeletal: no joint pain, no muscle pain  Skin: no rash, no lesion  Neurological: no headache, no dizziness, no numbness, no tingling  Psychiatric: no anxiety, no depression, no sleep difficulty  Female Genitourinary: complains of excessive vaginal bleeding when off megace             No visits with results within 6 Month(s) from this visit.   Latest known visit with results is:   Admission on 04/09/2024, Discharged on 04/10/2024   Component Date Value Ref Range Status    WBC 04/09/2024 8.76  3.90 - 12.70 K/uL Final    RBC 04/09/2024 3.02 (L)  4.00 - 5.40 M/uL Final    Hemoglobin 04/09/2024 7.6 (L)  12.0 - 16.0 g/dL Final    Hematocrit 04/09/2024 25.0 (L)  37.0 - 48.5 % Final    MCV 04/09/2024 83  82 - 98 fL Final    MCH 04/09/2024 25.2 (L)  27.0 - 31.0 pg Final    MCHC 04/09/2024 30.4 (L)  32.0 - 36.0 g/dL Final    RDW 04/09/2024 15.8 (H)  11.5 - 14.5 % Final    Platelets 04/09/2024 455 (H)  150 - 450 K/uL Final    MPV 04/09/2024 9.5  9.2 - 12.9 fL Final    Immature Granulocytes 04/09/2024 0.7 (H)  0.0 - 0.5 % Final    Gran # (ANC) 04/09/2024 5.7  1.8 - 7.7 K/uL Final    Immature Grans (Abs) 04/09/2024 0.06 (H)  0.00 - 0.04 K/uL Final    Lymph # 04/09/2024 1.8  1.0 - 4.8 K/uL Final    Mono # 04/09/2024 1.0  0.3 - 1.0 K/uL Final    Eos # 04/09/2024 0.2  0.0 - 0.5 K/uL Final    Baso # 04/09/2024 0.07  0.00 - 0.20 K/uL Final    nRBC 04/09/2024 0  0 /100 WBC Final    Gran % 04/09/2024 64.6  38.0 - 73.0 % Final    Lymph % 04/09/2024 20.7  18.0 - 48.0 % Final    Mono % 04/09/2024 11.5  4.0 - 15.0 % Final    Eosinophil % 04/09/2024 1.7  0.0 - 8.0 % Final    Basophil % 04/09/2024 0.8  0.0 - 1.9 % Final    Differential Method 04/09/2024 Automated   Final    Sodium 04/09/2024 142  136 - 145 mmol/L Final    Potassium 04/09/2024 4.2  3.5 - 5.1 mmol/L Final    Chloride 04/09/2024 108  95 - 110 mmol/L Final    CO2 04/09/2024 27  23 - 29 mmol/L Final    Glucose 04/09/2024 93  70 - 110  mg/dL Final    BUN 04/09/2024 11  6 - 20 mg/dL Final    Creatinine 04/09/2024 0.8  0.5 - 1.4 mg/dL Final    Calcium 04/09/2024 8.9  8.7 - 10.5 mg/dL Final    Total Protein 04/09/2024 7.1  6.0 - 8.4 g/dL Final    Albumin 04/09/2024 4.3  3.5 - 5.2 g/dL Final    Total Bilirubin 04/09/2024 0.3  0.1 - 1.0 mg/dL Final    Alkaline Phosphatase 04/09/2024 51 (L)  55 - 135 U/L Final    AST 04/09/2024 15  10 - 40 U/L Final    ALT 04/09/2024 14  10 - 44 U/L Final    eGFR 04/09/2024 >60.0  >60 mL/min/1.73 m^2 Final    Anion Gap 04/09/2024 7 (L)  8 - 16 mmol/L Final    QRS Duration 04/09/2024 74  ms Final    [facility] QTC Calculation 04/09/2024 441  ms Final    POC Preg Test, Ur 04/09/2024 Negative  Negative Final     Acceptable 04/09/2024 Yes   Final    Specimen UA 04/09/2024 Urine, Clean Catch   Final    Color, UA 04/09/2024 Orange (A)  Yellow, Straw, Nora Final    Appearance, UA 04/09/2024 Cloudy (A)  Clear Final    pH, UA 04/09/2024 8.0  5.0 - 8.0 Final    Specific Gravity, UA 04/09/2024 1.030  1.005 - 1.030 Final    Protein, UA 04/09/2024 1+ (A)  Negative Final    Glucose, UA 04/09/2024 Negative  Negative Final    Ketones, UA 04/09/2024 Negative  Negative Final    Bilirubin (UA) 04/09/2024 Negative  Negative Final    Occult Blood UA 04/09/2024 3+ (A)  Negative Final    Nitrite, UA 04/09/2024 Negative  Negative Final    Urobilinogen, UA 04/09/2024 Negative  Negative EU/dL Final    Leukocytes, UA 04/09/2024 Negative  Negative Final    RBC, UA 04/09/2024 >100 (H)  0 - 4 /hpf Final    WBC, UA 04/09/2024 27 (H)  0 - 5 /hpf Final    Bacteria 04/09/2024 None  None-Occ /hpf Final    Squam Epithel, UA 04/09/2024 3  /hpf Final    Hyaline Casts, UA 04/09/2024 0  0-1/lpf /lpf Final    Microscopic Comment 04/09/2024 SEE COMMENT   Final    Urine Culture, Routine 04/09/2024 No growth   Final    Group & Rh 04/09/2024 O POS   Final    Indirect Luis Eduardo 04/09/2024 NEG   Final    Specimen Outdate 04/09/2024 04/12/2024 23:59   Final  "      Past Medical History:   Diagnosis Date    Allergy     Insomnia      Past Surgical History:   Procedure Laterality Date    ceserean section  2007     Family History   Problem Relation Name Age of Onset    Hypertension Father      Arthritis Paternal Grandmother      COPD Paternal Grandmother      Diabetes Paternal Grandmother         Tests to Keep You Healthy    Mammogram: ORDERED BUT NOT SCHEDULED  Cervical Cancer Screening: Met on 4/12/2023  Last Blood Pressure <= 139/89 (6/26/2025): Yes      The 10-year CVD risk score (Samantha et al., 2008) is: 3.8%    Values used to calculate the score:      Age: 39 years      Sex: Female      Diabetic: No      Tobacco smoker: No      Systolic Blood Pressure: 138 mmHg      Is BP treated: Yes      HDL Cholesterol: 44 mg/dL      Total Cholesterol: 154 mg/dL     Marital Status: Single  Alcohol History:  reports that she does not currently use alcohol.  Tobacco History:  reports that she has never smoked. She has never been exposed to tobacco smoke. She has never used smokeless tobacco.  Drug History:  reports that she does not currently use drugs.    Health Maintenance Topics with due status: Not Due       Topic Last Completion Date    Influenza Vaccine 03/13/2023    Cervical Cancer Screening 04/12/2023    RSV Vaccine (Age 60+ and Pregnant patients) Not Due     Immunization History   Administered Date(s) Administered    Influenza - Trivalent - Afluria, Fluzone MDV 01/04/2006    Td (ADULT) 01/04/2006       Review of patient's allergies indicates:  No Known Allergies  Current Medications[1]        Objective:      Vitals:    06/26/25 0904 06/26/25 0912 06/26/25 0940   BP: (!) 144/88 (!) 140/90 138/86   Pulse: 95     SpO2: 99%     Weight: (!) 151.2 kg (333 lb 6.4 oz)     Height: 5' 8" (1.727 m)         Physical Exam  Vitals reviewed.   Constitutional:       General: She is not in acute distress.     Appearance: She is well-developed. She is obese.   HENT:      Head: " Normocephalic and atraumatic.      Right Ear: Tympanic membrane and ear canal normal.      Left Ear: Tympanic membrane and ear canal normal.   Neck:      Vascular: No carotid bruit.   Cardiovascular:      Rate and Rhythm: Normal rate and regular rhythm.      Heart sounds: No murmur heard.  Pulmonary:      Effort: Pulmonary effort is normal. No respiratory distress.      Breath sounds: Normal breath sounds. No wheezing or rales.   Abdominal:      General: There is no distension.      Palpations: Abdomen is soft.      Tenderness: There is no abdominal tenderness.   Musculoskeletal:      Cervical back: Neck supple.      Right lower leg: No edema.      Left lower leg: No edema.   Lymphadenopathy:      Cervical: No cervical adenopathy.   Skin:     General: Skin is warm and dry.      Findings: No rash.   Neurological:      General: No focal deficit present.      Mental Status: She is alert and oriented to person, place, and time.      Gait: Gait normal.   Psychiatric:         Mood and Affect: Mood normal.          Assessment:       1. Annual physical exam    2. Primary hypertension    3. Traumatic coccydynia    4. Morbid obesity due to excess calories    5. Mild major depression    6. Primary insomnia    7. Screening mammogram, encounter for           Assessment & Plan    ANNUAL PHYSICAL EXAM  -pt reports had labs a few months ago in [place], she will try to get me a copy    ESSENTIAL HYPERTENSION:  - Monitored blood pressure, currently elevated at 140/90 mmHg  - Continued Losartan 100 mg daily and initiated hydrochlorothiazide 25mg- take daily for 1 week and then 1 tablet 3 times daily.  - Instructed patient to monitor blood pressure at home, Advised that readings consistently in 140s or higher may require additional medication.  - Discussed importance of maintaining adequate hydration and increasing fluid intake when starting diuretic therapy.  -stressed importance of healthy diet and regular exercise. Needs to lose  weight to help with BP control  - pt advised I recommend rechecking kidney fxn in 2 weeks given addition of HCTZ- recommend she order CMP through [employer] self pay in 2 months.     DUB:  - Vaginal bleeding is currently controlled by Megestrol   - While effective for bleeding control, Megestrol's significant side effect of weight gain is negatively impacting blood pressure.  - Recommend f/u with gyn   - Noted patient needs ablation but lacks health insurance; discussed potential alternatives like birth control.    TRAUMATIC COCCYDYNIA:  - Patient had a fall on January 8th resulting in persistent coccyx pain requiring ongoing physical therapy.  - X-rays were negative for fractures.  - Scheduled for MRI today ordered by Dr. Rothman    MILD MAJOR DEPRESSION:  - Continued Duloxetine 30 mg daily for depression management.    PRIMARY INSOMNIA:  -stable, lunesta refilled  - Prescribed Lunesta for insomnia management.    MORBID OBESITY DUE TO EXCESS CALORIES  -reviewed weight gain of 27lbs and discussed imp of healthy diet and regular exercise  -cautioned on risks of chronic disease including DM, CVD, OA, etc with excess weight    SCREENING MAMMOGRAM  -discussed imp of breast CA screening. Encouraged her to call and ask about cash price option         Plan:       1. Annual physical exam    2. Primary hypertension  -     hydroCHLOROthiazide (HYDRODIURIL) 25 MG tablet; Take daily for 1 week then 3 times a week  Dispense: 30 tablet; Refill: 5  -     losartan (COZAAR) 100 MG tablet; Take 1 tablet (100 mg total) by mouth once daily.  Dispense: 90 tablet; Refill: 3    3. Traumatic coccydynia    4. Morbid obesity due to excess calories    5. Mild major depression  -     DULoxetine (CYMBALTA) 30 MG capsule; Take 1 capsule (30 mg total) by mouth every morning.  Dispense: 90 capsule; Refill: 3    6. Primary insomnia  -     eszopiclone (LUNESTA) 3 mg Tab; Take 1 tablet (3 mg total) by mouth once daily.  Dispense: 30 tablet;  Refill: 5    7. Screening mammogram, encounter for  -     Mammo Digital Screening Bilat w/ Alfonso (XPD); Future; Expected date: 06/26/2025      Follow up in about 6 months (around 12/26/2025).          Counseled on age and gender appropriate medical preventative services, including cancer screenings, immunizations, overall nutritional health, need for a consistent exercise regimen and an overall push towards maintaining a vigorous and active lifestyle.      This note was generated with the assistance of ambient listening technology. Verbal consent was obtained by the patient and accompanying visitor(s) for the recording of patient appointment to facilitate this note. I attest to having reviewed and edited the generated note for accuracy, though some syntax or spelling errors may persist. Please contact the author of this note for any clarification.       6/26/2025 Mariam Soriano NP           [1]   Current Outpatient Medications:     ferrous gluconate (FERGON) 324 MG tablet, Take 324 mg by mouth daily with breakfast., Disp: , Rfl:     fexofenadine (ALLEGRA) 60 MG tablet, Take 1 tablet by mouth Twice daily. Twice a day, Disp: , Rfl:     megestroL (MEGACE) 40 MG Tab, Take 40 mg by mouth 2 (two) times daily., Disp: , Rfl:     naproxen (NAPROSYN) 500 MG tablet, Take 1 tablet (500 mg total) by mouth every 12 (twelve) hours as needed (Pain)., Disp: 60 tablet, Rfl: 1    DULoxetine (CYMBALTA) 30 MG capsule, Take 1 capsule (30 mg total) by mouth every morning., Disp: 90 capsule, Rfl: 3    eszopiclone (LUNESTA) 3 mg Tab, Take 1 tablet (3 mg total) by mouth once daily., Disp: 30 tablet, Rfl: 5    hydroCHLOROthiazide (HYDRODIURIL) 25 MG tablet, Take daily for 1 week then 3 times a week, Disp: 30 tablet, Rfl: 5    LIDOcaine (LIDODERM) 5 %, Place 1 patch onto the skin once daily. Remove & Discard patch within 12 hours or as directed by MD (Patient not taking: Reported on 6/26/2025), Disp: 15 patch, Rfl: 0    losartan (COZAAR) 100  See HPI MG tablet, Take 1 tablet (100 mg total) by mouth once daily., Disp: 90 tablet, Rfl: 3    methocarbamoL (ROBAXIN) 500 MG Tab, Take 500 mg by mouth as needed. Pt unsure of dosage (Patient not taking: Reported on 6/26/2025), Disp: , Rfl:

## 2025-06-26 NOTE — PATIENT INSTRUCTIONS
Recommend you have comprehensive metabolic panel drawn at [facility] in about 2 months to check kidney function and potassium    Add hydrochlorothiazide 25mg one tablet daily for first week then 3 times weekly- make sure you drink enough water!

## 2025-07-01 ENCOUNTER — TELEPHONE (OUTPATIENT)
Dept: SPINE | Facility: CLINIC | Age: 40
End: 2025-07-01
Payer: COMMERCIAL

## 2025-07-01 NOTE — TELEPHONE ENCOUNTER
Attempted to call pt to let her know per Dr. Rothman There is no fracture or other worrisome findings around her tailbone on MRI.  Proceed with physical therapy. No answer VM full

## 2025-07-01 NOTE — TELEPHONE ENCOUNTER
----- Message from Kimani Rothman MD sent at 6/30/2025 11:59 AM CDT -----  There is no fracture or other worrisome findings around her tailbone on MRI.  Proceed with physical therapy

## 2025-07-15 ENCOUNTER — OFFICE VISIT (OUTPATIENT)
Dept: SPINE | Facility: CLINIC | Age: 40
End: 2025-07-15
Payer: OTHER MISCELLANEOUS

## 2025-07-15 VITALS — HEIGHT: 68 IN | BODY MASS INDEX: 44.41 KG/M2 | WEIGHT: 293 LBS

## 2025-07-15 DIAGNOSIS — M53.3 TRAUMATIC COCCYDYNIA: Primary | ICD-10-CM

## 2025-07-15 PROCEDURE — 99999 PR PBB SHADOW E&M-EST. PATIENT-LVL III: CPT | Mod: PBBFAC,,, | Performed by: PHYSICAL MEDICINE & REHABILITATION

## 2025-07-15 PROCEDURE — 99213 OFFICE O/P EST LOW 20 MIN: CPT | Mod: S$GLB,,, | Performed by: PHYSICAL MEDICINE & REHABILITATION

## 2025-07-15 NOTE — PROGRESS NOTES
"  SUBJECTIVE:    Patient ID: Janneth Nascimento is a 39 y.o. female.    Chief Complaint: Follow-up    She presents for follow-up having completed her course of physical therapy for traumatic coccydynia.  She is making progress and wants to continue.  She has been at work and is tolerating it just fine.  Current pain level is 2/10 but at times as high as 6/10 and interferes with her quality of life in terms activities daily living recreation and social activities.  We took this opportunity to review her sacral/coccyx MRI which did not show fracture.  More consistent with contusion.  The full report is scanned into the record          Past Medical History:   Diagnosis Date    Allergy     Insomnia      Social History[1]  Past Surgical History:   Procedure Laterality Date    ceserean section  2007     Family History   Problem Relation Name Age of Onset    Hypertension Father      Arthritis Paternal Grandmother      COPD Paternal Grandmother      Diabetes Paternal Grandmother       Vitals:    07/15/25 1011   Weight: (!) 151.2 kg (333 lb 5.4 oz)   Height: 5' 8" (1.727 m)       Review of Systems   Constitutional:  Negative for chills, diaphoresis, fatigue, fever and unexpected weight change.   HENT:  Negative for trouble swallowing.    Eyes:  Negative for visual disturbance.   Respiratory:  Negative for shortness of breath.    Cardiovascular:  Negative for chest pain.   Gastrointestinal:  Negative for abdominal pain, constipation, nausea and vomiting.   Genitourinary:  Negative for difficulty urinating.   Musculoskeletal:  Negative for arthralgias, back pain, gait problem, joint swelling, myalgias, neck pain and neck stiffness.   Neurological:  Negative for dizziness, speech difficulty, weakness, light-headedness, numbness and headaches.          Objective:      Physical Exam  Neurological:      Mental Status: She is alert and oriented to person, place, and time.             Assessment:       1. Traumatic coccydynia       "     Plan:     Improving.  Continue physical therapy.  Follow up here at the completion of therapy or as needed      Traumatic coccydynia  -     Ambulatory Referral/Consult to Physical Therapy               [1]   Social History  Socioeconomic History    Marital status: Single   Tobacco Use    Smoking status: Never     Passive exposure: Never    Smokeless tobacco: Never   Substance and Sexual Activity    Alcohol use: Not Currently    Drug use: Not Currently    Sexual activity: Not Currently